# Patient Record
Sex: MALE | Race: WHITE | NOT HISPANIC OR LATINO | Employment: OTHER | URBAN - METROPOLITAN AREA
[De-identification: names, ages, dates, MRNs, and addresses within clinical notes are randomized per-mention and may not be internally consistent; named-entity substitution may affect disease eponyms.]

---

## 2017-01-03 ENCOUNTER — TREATMENT (OUTPATIENT)
Dept: PHYSICAL THERAPY | Facility: CLINIC | Age: 72
End: 2017-01-03

## 2017-01-03 DIAGNOSIS — G57.01 NEUROPATHY OF RIGHT SCIATIC NERVE: Primary | ICD-10-CM

## 2017-01-03 DIAGNOSIS — R29.3 POOR POSTURE: ICD-10-CM

## 2017-01-03 DIAGNOSIS — M51.36 DDD (DEGENERATIVE DISC DISEASE), LUMBAR: ICD-10-CM

## 2017-01-03 DIAGNOSIS — R26.9 GAIT DISTURBANCE: ICD-10-CM

## 2017-01-03 PROCEDURE — 97112 NEUROMUSCULAR REEDUCATION: CPT | Performed by: PHYSICAL THERAPIST

## 2017-01-03 PROCEDURE — 97530 THERAPEUTIC ACTIVITIES: CPT | Performed by: PHYSICAL THERAPIST

## 2017-01-03 PROCEDURE — 97110 THERAPEUTIC EXERCISES: CPT | Performed by: PHYSICAL THERAPIST

## 2017-01-03 NOTE — PROGRESS NOTES
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  Hip: 4/10, Lower back: 2/10 at worst, but generally no back pain.    Subjective Questionnaire: Oswestry: 34  Clinical Progress: improved  Home Program Compliance: Yes, but must work strictly from handouts   Treatment has included: therapeutic exercise, neuromuscular re-education, manual therapy and gait training    Objective:       AT EVALUATION:   Gait analysis: Noted retro trunk way in gait. Limited push off (B) and extensive time on heels with gait. Limited trunk rotation.   Balance: Narrow VASU: eyes open: 30 sec, Eyes closed 25 sec. Posterior trunk lean.   Narrow VASU on Foam: eyes open: 30 sec, Closed: 10 sec.   Tandem: R on L: 3sec max, needs assist to assume position. L on R: needs help to assume position but can hold 12 sec.   SLS: R: unable, needs help to keep from falling, L: 2-3 sec. CGAx1.   TU.58 sec. No AD.   30 sec sit to stand: 12 x, no AD. Did use back of legs on chair 50% of the time.     Current: Gait:                  Balance: narrow VASU: Eyes closed: 30 sec                                 Narrow VASU on Foam, eyes Closed: 30 sec                                 Tandem: R on L: 25 sec. With L on R: 30 sec          SLS: R: unable to hold but can attempt indepently,  L: unable to hold but can attempt (I)    AROM:      Lumbar Spine Range of Motion  Lumbar Spine Flexion: 65  Lumbar Spine Extension: 20  Lumbar Spine Sidebend Left: 38  Lumbar Spine Sidebend Right: 46  Lumbar Spine Rotation Left: 62  Lumbar Spine Rotation Right: 68  Strength:      Strength RLE  R Hip Flexion: 5-/5 (L:5-/5)  R Hip Extension: 4/5 (L:5-/5)  R Hip  ABduction: 5-/5 (L:4+/5)  R Knee Flexion: 5/5  R Knee Extension: 5/5  R Ankle Dorsiflexion: 5/5  R Ankle Plantar Flexion: 4+/5 ((B) Heel rise.)              Special Tests:  TU.36 sec, 9.02sec (following cues to put weight through forefoot).    30 Sec. Sit to stand: 15x no UE use for assist.  No leaning on chair observed.   Assessment:   Functional Limitations: Walking, Lifting, Complaints of Pain, Decreased Strength, Decreased ROM, Postural Dysfunction, Poor segmental mobility   Pt has done very well with treatment overall.  Noted by objective measures, his ROM in the trunk and LE strength have both improved to help facilitate improved function with testing of TUG, Sit to stand and balance.  Unfortunately, he suffered a fall over the holiday reaching and lifting something overhead.  Due to lingering soreness of the R hip and ongoing gait and balance deficits, he would benefit from ongoing PT services for improved safety.     Plan:   PT Interventions: Therapeutic exercise - AROM, Therapeutic exercise - stretching, Therapeutic exercise - strengthening, Manual Therapy, Soft Tissue mobilization, Hot packs/ Moist heat  Progress toward previous goals: Partially Met     SHORT TERM GOALS:   4-5 visits     1. Patient to be compliant and progression of HEP. (MET)  2. Pain level < 5/10 at worst with mentioned activities to improve function around the home. (MET)  3. Increased thoracic lumbar and SIJ mobility to allow for increased lumbar AROM by 10 degrees (rot,)with less pain. (MET)  4. Pt able to obtain and maintain neutral posture on foam for > 1 min for improved axial loading. (MET, but cues for obtaining posture initial set up)     LONG TERM GOALS:  8-10 visits   1. Pt. to score < 15 % on Back Index. (not met)   2. Improve balance for improved safety and tolerance to standing. (SLS to 5 sec.(Modified) Tandem to 30 sec) (partially met, SLS not met)  3. Lumbar AROM to WFL all planes to allow for return to household &  recreational activities w/o increased symptoms.  (MET)  4. (B) LE and trunk strength to 5/5 to allow for pushing, pulling and activities to occur without pain > 2/10. (not met)   (Pushing garbage back toward home on driveway withno difficulty)  5. Pt will report the ability to walk/standin > 30 min before requiring sit down rest. (Progress toward goal)   6. Pt to score 4/4 on M-CTSIB for improved balance. (MET)    Recommendations: Continue as planned  Timeframe: 3 weeks  Prognosis to achieve goals: good    Therapy Exercise 71041 15 minutes, NMR 57512 8 minutes and Ther Act 13829 15 minutes    Timed Code Treatment: 38   Minutes     Total Treatment Time: 50      Minutes      PT Signature: Santo Gutierrez PT  Lic # [id number]      Based upon review of the patient's progress and continued therapy plan, it is my medical opinion that Julio Busby should continue physical therapy treatment at [facility]  [address] [facility], [address] [address].    Signature:  Alejandra Escobedo MD

## 2017-01-10 ENCOUNTER — TREATMENT (OUTPATIENT)
Dept: PHYSICAL THERAPY | Facility: CLINIC | Age: 72
End: 2017-01-10

## 2017-01-10 DIAGNOSIS — M51.36 DDD (DEGENERATIVE DISC DISEASE), LUMBAR: ICD-10-CM

## 2017-01-10 DIAGNOSIS — R26.9 GAIT DISTURBANCE: ICD-10-CM

## 2017-01-10 DIAGNOSIS — R29.3 POOR POSTURE: ICD-10-CM

## 2017-01-10 DIAGNOSIS — G57.01 NEUROPATHY OF RIGHT SCIATIC NERVE: Primary | ICD-10-CM

## 2017-01-10 PROCEDURE — 97110 THERAPEUTIC EXERCISES: CPT | Performed by: PHYSICAL THERAPIST

## 2017-01-10 PROCEDURE — 97112 NEUROMUSCULAR REEDUCATION: CPT | Performed by: PHYSICAL THERAPIST

## 2017-01-10 NOTE — PROGRESS NOTES
Daily Progress Note  Visits: 9      Subjective  No new complaints.  The right hip hurts less from the fall weeks ago.   Pain level: (0-10):   Objective      See manual, procedures and exercise log  Assessment/Plan  Pt continues to struggle consistently performing normalized gait pattern with heel strike and roll over.  He is seemingly having more neurologic deficits that are limiting his ability to ambulate with less coordination issues.  He may be a candidate for a neurologic consult at conclusion of PT.      Progress per Plan of Care       Manual Therapy:    -     mins  84418;  Therapeutic Exercise:    25     mins  79188;     Neuromuscular Angel:    12    mins  08781;    Therapeutic Activity:     -     mins  73550;     Gait Training:      -     mins  81549;     Ultrasound:     -     mins  65908;    Electrical Stimulation:    -     mins  31708 ( );  Dry Needling     -     mins self-pay    Timed Code Treatment: 37 Minutes   Total Treatment Time: 60 Minutes      Santo Gutierrez PT  Physical Therapist, DPT  #[id number]

## 2017-01-12 ENCOUNTER — TREATMENT (OUTPATIENT)
Dept: PHYSICAL THERAPY | Facility: CLINIC | Age: 72
End: 2017-01-12

## 2017-01-12 DIAGNOSIS — M51.36 DDD (DEGENERATIVE DISC DISEASE), LUMBAR: ICD-10-CM

## 2017-01-12 DIAGNOSIS — G57.01 NEUROPATHY OF RIGHT SCIATIC NERVE: Primary | ICD-10-CM

## 2017-01-12 DIAGNOSIS — R26.9 GAIT DISTURBANCE: ICD-10-CM

## 2017-01-12 DIAGNOSIS — R29.3 POOR POSTURE: ICD-10-CM

## 2017-01-12 PROCEDURE — 97112 NEUROMUSCULAR REEDUCATION: CPT | Performed by: PHYSICAL THERAPIST

## 2017-01-12 PROCEDURE — 97110 THERAPEUTIC EXERCISES: CPT | Performed by: PHYSICAL THERAPIST

## 2017-01-12 NOTE — PROGRESS NOTES
Daily Progress Note  Visits: 10    Subjective  Doing okay, but I still feel some discomfort in the L low back today.  I am doing better pushing my garbage container up driveway.    Pain level: (0-10):   Objective : Observation: pt was able to perform gait training activity with much better form and posture for longer periods of time.       See manual, procedures and exercise log  Assessment/Plan  Discussed possibility of consult with neurologist at conclusion of PT intervention.  He demonstrates enough coordination deficits and balance impairment and gait deviations to warrant at least the discussion of underlying medical issue.  With that said, he has still been able to demonstrate some functional improvements but having some difficulty with carryover.    Progress per Plan of Care       Manual Therapy:    -     mins  91477;  Therapeutic Exercise:    35     mins  43692;     Neuromuscular Angel:    15    mins  68410;    Therapeutic Activity:     -     mins  18520;     Gait Training:      -     mins  38807;     Ultrasound:     -     mins  57423;    Electrical Stimulation:    -     mins  17890 ( );  Dry Needling     -     mins self-pay    Timed Code Treatment: 50 Minutes   Total Treatment Time: 55 Minutes      Santo Gutierrez PT  Physical Therapist, DPT  #[id number]

## 2017-01-17 ENCOUNTER — TREATMENT (OUTPATIENT)
Dept: PHYSICAL THERAPY | Facility: CLINIC | Age: 72
End: 2017-01-17

## 2017-01-17 DIAGNOSIS — M51.36 DDD (DEGENERATIVE DISC DISEASE), LUMBAR: ICD-10-CM

## 2017-01-17 DIAGNOSIS — R26.9 GAIT DISTURBANCE: ICD-10-CM

## 2017-01-17 DIAGNOSIS — G57.01 NEUROPATHY OF RIGHT SCIATIC NERVE: Primary | ICD-10-CM

## 2017-01-17 DIAGNOSIS — R29.3 POOR POSTURE: ICD-10-CM

## 2017-01-17 PROCEDURE — 97116 GAIT TRAINING THERAPY: CPT | Performed by: PHYSICAL THERAPIST

## 2017-01-17 PROCEDURE — 97112 NEUROMUSCULAR REEDUCATION: CPT | Performed by: PHYSICAL THERAPIST

## 2017-01-17 PROCEDURE — 97110 THERAPEUTIC EXERCISES: CPT | Performed by: PHYSICAL THERAPIST

## 2017-01-17 NOTE — PROGRESS NOTES
Daily Progress Note  Visits: 11      Subjective  The Right hip is still just a little sore but its okay.  The Low back just feels tight until I loosen up with stretches  Pain level: (0-10):   Objective : Resisted ambulation with improved posture and push-off.     See manual, procedures and exercise log  Assessment/Plan  Pt continues to make good progress with balance and gait training.  Endurance and posture improving but coordination can still be challenging.      Progress per Plan of Care       Manual Therapy:    -     mins  06021;  Therapeutic Exercise:    35     mins  48661;     Neuromuscular Angel:    10    mins  56670;    Therapeutic Activity:     -     mins  49958;     Gait Training:      10     mins  57034;   (verbal cues while on TM)   Ultrasound:     -     mins  32205;    Electrical Stimulation:    -     mins  54244 ( );  Dry Needling     -     mins self-pay    Timed Code Treatment: 55 Minutes   Total Treatment Time: 65 Minutes      Santo Gutierrez PT  Physical Therapist, DPT  #[id number]

## 2017-01-19 ENCOUNTER — TREATMENT (OUTPATIENT)
Dept: PHYSICAL THERAPY | Facility: CLINIC | Age: 72
End: 2017-01-19

## 2017-01-19 DIAGNOSIS — M51.36 DDD (DEGENERATIVE DISC DISEASE), LUMBAR: ICD-10-CM

## 2017-01-19 DIAGNOSIS — R29.3 POOR POSTURE: ICD-10-CM

## 2017-01-19 DIAGNOSIS — G57.01 NEUROPATHY OF RIGHT SCIATIC NERVE: Primary | ICD-10-CM

## 2017-01-19 DIAGNOSIS — R26.9 GAIT DISTURBANCE: ICD-10-CM

## 2017-01-19 PROCEDURE — 97110 THERAPEUTIC EXERCISES: CPT | Performed by: PHYSICAL THERAPIST

## 2017-01-19 PROCEDURE — 97112 NEUROMUSCULAR REEDUCATION: CPT | Performed by: PHYSICAL THERAPIST

## 2017-01-19 NOTE — PROGRESS NOTES
Daily Progress Note  Visits: 12      Subjective  No new complaints from patient.  Hip continues to improve but still has some lingering soreness.    Pain level: (0-10):   Objective      See manual, procedures and exercise log  Assessment/Plan  Pt continues to demonstrate improving stability with practice but still has difficulty with routine stretching activity that is to be performed at home independently. Will re-assess next week for D/C     Progress per Plan of Care       Manual Therapy:    5     mins  55643;  Therapeutic Exercise:    25     mins  02597;     Neuromuscular Angel:    15    mins  86509;    Therapeutic Activity:     -     mins  17798;     Gait Training:      -     mins  91822;     Ultrasound:     -     mins  35558;    Electrical Stimulation:    -     mins  85211 ( );  Dry Needling     -     mins self-pay    Timed Code Treatment: 45 Minutes   Total Treatment Time: 65 Minutes      Santo Gutierrez PT  Physical Therapist, DPT  #[id number]

## 2017-01-24 ENCOUNTER — TREATMENT (OUTPATIENT)
Dept: PHYSICAL THERAPY | Facility: CLINIC | Age: 72
End: 2017-01-24

## 2017-01-24 DIAGNOSIS — R26.9 GAIT DISTURBANCE: ICD-10-CM

## 2017-01-24 DIAGNOSIS — G57.01 NEUROPATHY OF RIGHT SCIATIC NERVE: Primary | ICD-10-CM

## 2017-01-24 DIAGNOSIS — M51.36 DDD (DEGENERATIVE DISC DISEASE), LUMBAR: ICD-10-CM

## 2017-01-24 DIAGNOSIS — R29.3 POOR POSTURE: ICD-10-CM

## 2017-01-24 PROCEDURE — 97112 NEUROMUSCULAR REEDUCATION: CPT | Performed by: PHYSICAL THERAPIST

## 2017-01-24 PROCEDURE — 97110 THERAPEUTIC EXERCISES: CPT | Performed by: PHYSICAL THERAPIST

## 2017-01-24 NOTE — PROGRESS NOTES
Physical Therapy Daily Progress Note  Visits:13    Julio Busby reports: Feeling pretty good today.    Subjective : Pain:  Objective : Gait: 3% grade on TM good heel strike and stride length but after verbal cues to initiate walking.    See Exercise, Manual, and Modality Logs for complete treatment.   Assessment & Plan     Assessment  Assessment details: Pt was able to perform gait training with best mechanics to date.  His balance reactions have much improved and we were able to review his HEP for pending D/C next visit.        Progress per Plan of Care, progress note next visit.         Manual Therapy:    -     mins  86489;  Therapeutic Exercise:    25     mins  87763;     Neuromuscular Angel:    15    mins  93677;    Therapeutic Activity:     -     mins  25074;     Gait Trainin     mins  62588;     Ultrasound:     -     mins  49367;    Electrical Stimulation:    -     mins  18195 ( );  Dry Needling     -     mins self-pay    Timed Treatment:   45  mins   Total Treatment:     65   mins    LALO Petersen License #[id number]    Physical Therapist

## 2017-01-26 ENCOUNTER — TREATMENT (OUTPATIENT)
Dept: PHYSICAL THERAPY | Facility: CLINIC | Age: 72
End: 2017-01-26

## 2017-01-26 DIAGNOSIS — M51.36 DDD (DEGENERATIVE DISC DISEASE), LUMBAR: ICD-10-CM

## 2017-01-26 DIAGNOSIS — R29.3 POOR POSTURE: ICD-10-CM

## 2017-01-26 DIAGNOSIS — R26.9 GAIT DISTURBANCE: ICD-10-CM

## 2017-01-26 DIAGNOSIS — G57.01 NEUROPATHY OF RIGHT SCIATIC NERVE: Primary | ICD-10-CM

## 2017-01-26 PROCEDURE — 97110 THERAPEUTIC EXERCISES: CPT | Performed by: PHYSICAL THERAPIST

## 2017-01-26 PROCEDURE — 97112 NEUROMUSCULAR REEDUCATION: CPT | Performed by: PHYSICAL THERAPIST

## 2017-01-26 NOTE — PROGRESS NOTES
Discharge Note  Visits: 14, Treatment dates 16 to 17    Subjective   The back does not hurt much at all and feels much better when I'm sleeping.    Pain level (0-10) :  0/10 right now, when sleeping its about 1/10.    Objective      Current:balance:   Balance: narrow VASU: Eyes closed: 30 sec  Narrow VASU on Foam, eyes Closed: 30 sec  Tandem: R on L: 25 sec. With L on R: 30 sec   SLS: R: unable to hold but can attempt indepently, L: unable to hold but can attempt (I)         TU.9 sec., 8.43 sec.   30 Sec. Sit to stand: 15x no UE use for assist. No leaning on chair observed. (not re-tested)   Outcomes Measure: 22%       SHORT TERM GOALS:   4-5 visits   1. Patient to be compliant and progression of HEP. (MET)  2. Pain level < 5/10 at worst with mentioned activities to improve function around the home. (MET)  3. Increased thoracic lumbar and SIJ mobility to allow for increased lumbar AROM by 10 degrees (rot,)with less pain. (MET)  4. Pt able to obtain and maintain neutral posture on foam for > 1 min for improved axial loading. (MET, no cues required)      LONG TERM GOALS:  8-10 visits   1. Pt. to score < 15 % on Back Index. (not met)   2. Improve balance for improved safety and tolerance to standing. (SLS to 5 sec.(Modified) Tandem to 30 sec) (partially met, Tandem met, SLS unable)                             3. Lumbar AROM to WFL all planes to allow for return to household & recreational activities w/o increased symptoms.  (MET)    4. (B) LE and trunk strength to 5/5 to allow for pushing, pulling and activities to occur without pain >   2/10. (not met for strength )     (Pushing garbage back toward home on driveway withno difficulty)    5. Pt will report the ability to walk/standin > 30 min before requiring sit down rest. (MET)     6. Pt to score 4/4 on M-CTSIB for improved balance. (MET)    Goals: All Met, except Back index (22%) and strength.      Assessment/Plan /Discharge status.  Discharge to [facility].  Pt  has done very well with skilled PT intervention and has met plateu with progress.      Other  Plan Additional:  Discharge to [facility]      Manual Therapy:    -     mins  13163;  Therapeutic Exercise:    15     mins  85757;     Neuromuscular Angel:    10    mins  83185;    Therapeutic Activity:     6     mins  52716;     Gait Training:      -     mins  51372;     Ultrasound:     -     mins  46032;    Electrical Stimulation:    -     mins  05600 ( );  Dry Needling     -     mins self-pay    Timed treatment minutes: 31  Total treatment minutes:58      Santo Gutierrez PT  Physical Therapist  #[id number]

## 2017-02-02 ENCOUNTER — OFFICE VISIT (OUTPATIENT)
Dept: FAMILY MEDICINE CLINIC | Facility: CLINIC | Age: 72
End: 2017-02-02

## 2017-02-02 VITALS
DIASTOLIC BLOOD PRESSURE: 74 MMHG | HEIGHT: 68 IN | BODY MASS INDEX: 24.71 KG/M2 | SYSTOLIC BLOOD PRESSURE: 116 MMHG | WEIGHT: 163 LBS | TEMPERATURE: 97.7 F | HEART RATE: 58 BPM | OXYGEN SATURATION: 98 %

## 2017-02-02 DIAGNOSIS — I10 ESSENTIAL HYPERTENSION: ICD-10-CM

## 2017-02-02 DIAGNOSIS — R53.83 TIRED: ICD-10-CM

## 2017-02-02 DIAGNOSIS — E78.5 HYPERLIPIDEMIA, UNSPECIFIED HYPERLIPIDEMIA TYPE: ICD-10-CM

## 2017-02-02 DIAGNOSIS — M51.36 DDD (DEGENERATIVE DISC DISEASE), LUMBAR: Primary | ICD-10-CM

## 2017-02-02 PROCEDURE — 99214 OFFICE O/P EST MOD 30 MIN: CPT | Performed by: FAMILY MEDICINE

## 2017-02-02 RX ORDER — HYDROCODONE BITARTRATE AND ACETAMINOPHEN 10; 325 MG/1; MG/1
1 TABLET ORAL EVERY 6 HOURS PRN
Qty: 60 TABLET | Refills: 0 | Status: SHIPPED | OUTPATIENT
Start: 2017-02-02 | End: 2017-05-03 | Stop reason: SDUPTHER

## 2017-02-02 NOTE — PROGRESS NOTES
Subjective   Julio Busby is a 71 y.o. male.     History of Present Illness   Julio Busby 71 y.o. male who presents today for recheck of lumbar DDD and left sciatica. Continues with radiation into the left buttucks.  Pain is aggravated by standing. Did see some improvement with physical therapy.  he has a history of   Patient Active Problem List   Diagnosis   • CVA (cerebrovascular accident)   • Diverticulosis of colon without hemorrhage   • Hyperlipidemia   • Essential hypertension   • Anemia due to blood loss, chronic   • DDD (degenerative disc disease), lumbar   • Neuropathy of right sciatic nerve   . Also complains of worsening fatigue over the last 2 months.    The following portions of the patient's history were reviewed and updated as appropriate: allergies, current medications, past family history, past medical history, past social history, past surgical history and problem list.    Review of Systems   Constitutional: Positive for fatigue.   Respiratory: Negative for shortness of breath.    Cardiovascular: Negative for chest pain and leg swelling.   Gastrointestinal: Negative for nausea and vomiting.   Musculoskeletal: Positive for back pain.   Skin: Negative.        Objective   Physical Exam   Constitutional: He appears well-developed and well-nourished.   HENT:   Head: Normocephalic.   Eyes: EOM are normal. Pupils are equal, round, and reactive to light.   Neck: Normal range of motion.   Cardiovascular: Normal rate, regular rhythm and normal heart sounds.    Pulmonary/Chest: Effort normal and breath sounds normal.   Skin: Skin is warm and dry.   Psychiatric: He has a normal mood and affect. His behavior is normal. Judgment and thought content normal.   Vitals reviewed.      Assessment/Plan   Julio was seen today for back pain.    Diagnoses and all orders for this visit:    DDD (degenerative disc disease), lumbar  -     HYDROcodone-acetaminophen (NORCO)  MG per tablet; Take 1 tablet by mouth  Every 6 (Six) Hours As Needed for moderate pain (4-6).    Tired  -     Lipid Panel  -     Comprehensive Metabolic Panel  -     TSH  -     CBC & AUTO Differential    Essential hypertension  -     Comprehensive Metabolic Panel    Hyperlipidemia, unspecified hyperlipidemia type  -     Lipid Panel    Review labs and start cymbalta if normal

## 2017-02-03 LAB
ALBUMIN SERPL-MCNC: 4.1 G/DL (ref 3.5–4.8)
ALBUMIN/GLOB SERPL: 1.8 {RATIO} (ref 1.1–2.5)
ALP SERPL-CCNC: 89 IU/L (ref 39–117)
ALT SERPL-CCNC: 8 IU/L (ref 0–44)
AST SERPL-CCNC: 15 IU/L (ref 0–40)
BASOPHILS # BLD AUTO: 0 X10E3/UL (ref 0–0.2)
BASOPHILS NFR BLD AUTO: 0 %
BILIRUB SERPL-MCNC: 1 MG/DL (ref 0–1.2)
BUN SERPL-MCNC: 16 MG/DL (ref 8–27)
BUN/CREAT SERPL: 19 (ref 10–22)
CALCIUM SERPL-MCNC: 9.2 MG/DL (ref 8.6–10.2)
CHLORIDE SERPL-SCNC: 99 MMOL/L (ref 96–106)
CHOLEST SERPL-MCNC: 174 MG/DL (ref 100–199)
CO2 SERPL-SCNC: 26 MMOL/L (ref 18–29)
CREAT SERPL-MCNC: 0.85 MG/DL (ref 0.76–1.27)
EOSINOPHIL # BLD AUTO: 0.1 X10E3/UL (ref 0–0.4)
EOSINOPHIL NFR BLD AUTO: 2 %
ERYTHROCYTE [DISTWIDTH] IN BLOOD BY AUTOMATED COUNT: 13.2 % (ref 12.3–15.4)
GLOBULIN SER CALC-MCNC: 2.3 G/DL (ref 1.5–4.5)
GLUCOSE SERPL-MCNC: 90 MG/DL (ref 65–99)
HCT VFR BLD AUTO: 41.1 % (ref 37.5–51)
HDLC SERPL-MCNC: 66 MG/DL
HGB BLD-MCNC: 13.6 G/DL (ref 12.6–17.7)
IMM GRANULOCYTES # BLD: 0 X10E3/UL (ref 0–0.1)
IMM GRANULOCYTES NFR BLD: 0 %
LDLC SERPL CALC-MCNC: 78 MG/DL (ref 0–99)
LYMPHOCYTES # BLD AUTO: 2 X10E3/UL (ref 0.7–3.1)
LYMPHOCYTES NFR BLD AUTO: 32 %
MCH RBC QN AUTO: 29.2 PG (ref 26.6–33)
MCHC RBC AUTO-ENTMCNC: 33.1 G/DL (ref 31.5–35.7)
MCV RBC AUTO: 88 FL (ref 79–97)
MONOCYTES # BLD AUTO: 0.7 X10E3/UL (ref 0.1–0.9)
MONOCYTES NFR BLD AUTO: 11 %
NEUTROPHILS # BLD AUTO: 3.6 X10E3/UL (ref 1.4–7)
NEUTROPHILS NFR BLD AUTO: 55 %
PLATELET # BLD AUTO: 232 X10E3/UL (ref 150–379)
POTASSIUM SERPL-SCNC: 4.5 MMOL/L (ref 3.5–5.2)
PROT SERPL-MCNC: 6.4 G/DL (ref 6–8.5)
RBC # BLD AUTO: 4.66 X10E6/UL (ref 4.14–5.8)
SODIUM SERPL-SCNC: 139 MMOL/L (ref 134–144)
TRIGL SERPL-MCNC: 152 MG/DL (ref 0–149)
TSH SERPL DL<=0.005 MIU/L-ACNC: 0.77 UIU/ML (ref 0.45–4.5)
VLDLC SERPL CALC-MCNC: 30 MG/DL (ref 5–40)
WBC # BLD AUTO: 6.5 X10E3/UL (ref 3.4–10.8)

## 2017-02-03 RX ORDER — DULOXETIN HYDROCHLORIDE 60 MG/1
60 CAPSULE, DELAYED RELEASE ORAL DAILY
Qty: 30 CAPSULE | Refills: 5 | Status: SHIPPED | OUTPATIENT
Start: 2017-02-03 | End: 2017-05-03 | Stop reason: SDUPTHER

## 2017-02-12 DIAGNOSIS — I10 ESSENTIAL HYPERTENSION: ICD-10-CM

## 2017-02-12 RX ORDER — LISINOPRIL 10 MG/1
TABLET ORAL
Qty: 30 TABLET | Refills: 0 | Status: SHIPPED | OUTPATIENT
Start: 2017-02-12 | End: 2017-03-19 | Stop reason: SDUPTHER

## 2017-02-22 ENCOUNTER — DOCUMENTATION (OUTPATIENT)
Dept: PHYSICAL THERAPY | Facility: CLINIC | Age: 72
End: 2017-02-22

## 2017-03-19 DIAGNOSIS — I10 ESSENTIAL HYPERTENSION: ICD-10-CM

## 2017-03-19 RX ORDER — LISINOPRIL 10 MG/1
TABLET ORAL
Qty: 30 TABLET | Refills: 0 | Status: SHIPPED | OUTPATIENT
Start: 2017-03-19 | End: 2017-04-17 | Stop reason: SDUPTHER

## 2017-04-17 DIAGNOSIS — I10 ESSENTIAL HYPERTENSION: ICD-10-CM

## 2017-04-17 RX ORDER — LISINOPRIL 10 MG/1
TABLET ORAL
Qty: 30 TABLET | Refills: 0 | Status: SHIPPED | OUTPATIENT
Start: 2017-04-17 | End: 2017-05-03 | Stop reason: SDUPTHER

## 2017-05-03 ENCOUNTER — OFFICE VISIT (OUTPATIENT)
Dept: FAMILY MEDICINE CLINIC | Facility: CLINIC | Age: 72
End: 2017-05-03

## 2017-05-03 VITALS
WEIGHT: 155 LBS | SYSTOLIC BLOOD PRESSURE: 135 MMHG | HEIGHT: 68 IN | BODY MASS INDEX: 23.49 KG/M2 | DIASTOLIC BLOOD PRESSURE: 80 MMHG | TEMPERATURE: 97.6 F | HEART RATE: 50 BPM | RESPIRATION RATE: 14 BRPM

## 2017-05-03 DIAGNOSIS — I10 ESSENTIAL HYPERTENSION: ICD-10-CM

## 2017-05-03 DIAGNOSIS — M51.36 DDD (DEGENERATIVE DISC DISEASE), LUMBAR: Primary | ICD-10-CM

## 2017-05-03 DIAGNOSIS — I63.89 CEREBROVASCULAR ACCIDENT (CVA) DUE TO OTHER MECHANISM (HCC): ICD-10-CM

## 2017-05-03 DIAGNOSIS — E78.2 MIXED HYPERLIPIDEMIA: ICD-10-CM

## 2017-05-03 DIAGNOSIS — R29.898 WEAKNESS OF BOTH LOWER EXTREMITIES: ICD-10-CM

## 2017-05-03 PROCEDURE — 99214 OFFICE O/P EST MOD 30 MIN: CPT | Performed by: FAMILY MEDICINE

## 2017-05-03 RX ORDER — ATORVASTATIN CALCIUM 20 MG/1
20 TABLET, FILM COATED ORAL DAILY
Qty: 90 TABLET | Refills: 1 | Status: SHIPPED | OUTPATIENT
Start: 2017-05-03

## 2017-05-03 RX ORDER — HYDROCODONE BITARTRATE AND ACETAMINOPHEN 10; 325 MG/1; MG/1
1 TABLET ORAL 2 TIMES DAILY PRN
Qty: 60 TABLET | Refills: 0 | Status: SHIPPED | OUTPATIENT
Start: 2017-05-03 | End: 2017-06-26

## 2017-05-03 RX ORDER — LISINOPRIL 10 MG/1
10 TABLET ORAL DAILY
Qty: 30 TABLET | Refills: 5 | Status: SHIPPED | OUTPATIENT
Start: 2017-05-03

## 2017-05-03 RX ORDER — CLOPIDOGREL BISULFATE 75 MG/1
75 TABLET ORAL DAILY
Qty: 90 TABLET | Refills: 1 | Status: SHIPPED | OUTPATIENT
Start: 2017-05-03

## 2017-05-03 RX ORDER — DULOXETIN HYDROCHLORIDE 60 MG/1
60 CAPSULE, DELAYED RELEASE ORAL DAILY
Qty: 30 CAPSULE | Refills: 5 | Status: SHIPPED | OUTPATIENT
Start: 2017-05-03 | End: 2017-07-27 | Stop reason: ALTCHOICE

## 2017-05-15 ENCOUNTER — OFFICE VISIT (OUTPATIENT)
Dept: FAMILY MEDICINE CLINIC | Facility: CLINIC | Age: 72
End: 2017-05-15

## 2017-05-15 VITALS
HEART RATE: 54 BPM | TEMPERATURE: 97.7 F | RESPIRATION RATE: 14 BRPM | BODY MASS INDEX: 22.81 KG/M2 | HEIGHT: 69 IN | SYSTOLIC BLOOD PRESSURE: 137 MMHG | DIASTOLIC BLOOD PRESSURE: 79 MMHG | WEIGHT: 154 LBS

## 2017-05-15 DIAGNOSIS — R04.0 EPISTAXIS: ICD-10-CM

## 2017-05-15 DIAGNOSIS — Z00.00 ANNUAL PHYSICAL EXAM: Primary | ICD-10-CM

## 2017-05-15 PROCEDURE — G0438 PPPS, INITIAL VISIT: HCPCS | Performed by: FAMILY MEDICINE

## 2017-05-15 PROCEDURE — 99212 OFFICE O/P EST SF 10 MIN: CPT | Performed by: FAMILY MEDICINE

## 2017-06-19 ENCOUNTER — OFFICE VISIT (OUTPATIENT)
Dept: FAMILY MEDICINE CLINIC | Facility: CLINIC | Age: 72
End: 2017-06-19

## 2017-06-19 VITALS
WEIGHT: 152 LBS | HEIGHT: 69 IN | TEMPERATURE: 98.2 F | HEART RATE: 49 BPM | BODY MASS INDEX: 22.51 KG/M2 | SYSTOLIC BLOOD PRESSURE: 114 MMHG | DIASTOLIC BLOOD PRESSURE: 68 MMHG

## 2017-06-19 DIAGNOSIS — R41.3 COMPLAINTS OF MEMORY DISTURBANCE: Primary | ICD-10-CM

## 2017-06-19 PROCEDURE — 2014F MENTAL STATUS ASSESS: CPT | Performed by: FAMILY MEDICINE

## 2017-06-19 PROCEDURE — 99213 OFFICE O/P EST LOW 20 MIN: CPT | Performed by: FAMILY MEDICINE

## 2017-06-19 RX ORDER — DONEPEZIL HYDROCHLORIDE 5 MG/1
5 TABLET, FILM COATED ORAL NIGHTLY
Qty: 30 TABLET | Refills: 5 | Status: SHIPPED | OUTPATIENT
Start: 2017-06-19 | End: 2017-08-28 | Stop reason: SDUPTHER

## 2017-06-19 NOTE — PROGRESS NOTES
"Subjective   Julio Busby is a 71 y.o. male.     CC: Memory Issues    History of Present Illness     Pt comes in with his son today concerned about some memory decline over the past several years.   Mainly short memory issues and no weird behaviors. FH: no dementia.      The following portions of the patient's history were reviewed and updated as appropriate: allergies, current medications, past family history, past medical history, past social history, past surgical history and problem list.    Review of Systems   Constitutional: Negative for activity change, chills, fatigue and fever.   Respiratory: Negative for cough and shortness of breath.    Cardiovascular: Negative for chest pain and palpitations.   Gastrointestinal: Negative for abdominal pain.   Endocrine: Negative for cold intolerance.   Neurological:        Memory issues   Psychiatric/Behavioral: Negative for behavioral problems and dysphoric mood. The patient is not nervous/anxious.      /68  Pulse (!) 49  Temp 98.2 °F (36.8 °C) (Oral)   Ht 68.5\" (174 cm)  Wt 152 lb (68.9 kg)  BMI 22.78 kg/m2    Objective   Physical Exam   Constitutional: He appears well-developed and well-nourished.   Neck: Neck supple. No thyromegaly present.   Cardiovascular: Normal rate and regular rhythm.    No murmur heard.  Pulmonary/Chest: Effort normal and breath sounds normal.   Abdominal: Bowel sounds are normal.   Psychiatric: He has a normal mood and affect. His behavior is normal.   Nursing note and vitals reviewed.  MMSE: 25/30    Assessment/Plan   Julio was seen today for memory loss.    Diagnoses and all orders for this visit:    Complaints of memory disturbance  -     donepezil (ARICEPT) 5 MG tablet; Take 1 tablet by mouth Every Night.  -     Ambulatory Referral to Neurology               "

## 2017-06-26 ENCOUNTER — OFFICE VISIT (OUTPATIENT)
Dept: NEUROSURGERY | Facility: CLINIC | Age: 72
End: 2017-06-26

## 2017-06-26 VITALS
WEIGHT: 152 LBS | SYSTOLIC BLOOD PRESSURE: 136 MMHG | HEART RATE: 57 BPM | BODY MASS INDEX: 22.51 KG/M2 | HEIGHT: 69 IN | DIASTOLIC BLOOD PRESSURE: 77 MMHG

## 2017-06-26 DIAGNOSIS — Z86.73 HX OF CEREBRAL INFARCTION: ICD-10-CM

## 2017-06-26 DIAGNOSIS — M48.062 SPINAL STENOSIS, LUMBAR REGION, WITH NEUROGENIC CLAUDICATION: ICD-10-CM

## 2017-06-26 DIAGNOSIS — M43.16 SPONDYLOLISTHESIS AT L4-L5 LEVEL: Primary | ICD-10-CM

## 2017-06-26 PROCEDURE — 99204 OFFICE O/P NEW MOD 45 MIN: CPT | Performed by: NURSE PRACTITIONER

## 2017-06-26 NOTE — PROGRESS NOTES
Subjective   Patient ID: Julio Busby is a 71 y.o. male is being seen for consultation today at the request of Sidney Brandt MD for low back pain that radiates into the left and right buttocks. He states that is is painful when he walks. He is not currently taking any pain medication.    HPI Comments: Mr. Busby is a very pleasant 71-year-old male with a history of 2 prior strokes in the 1990s.  He is currently on Plavix.  He also has a history of hypertension.  He has a 9 month history of worsening low back pain with radiation into both buttocks and down the lateral aspect of both mid-thighs.  The right leg pain is equal to the left.  He has been having increasing difficulty with walking and has associated tingling in both legs.  He had similar symptoms back in 2016.  He underwent an MRI of the lumbar spine and was referred for outpatient epidural injections.  He did not notice any significant improvement after the injections.  Mr. Busby has not undergone any subsequent imaging of his lumbar spine.  He denies any bowel or bladder incontinence but is reporting weakness with prolonged standing and walking.  He denies any falls.  He states that his leg symptoms are much more severe than the back pain.    Back Pain   This is a recurrent problem. The current episode started more than 1 month ago. The problem occurs constantly. The problem has been gradually worsening since onset. The pain is present in the lumbar spine and gluteal. The quality of the pain is described as aching. Radiates to: bilateral buttocks and into the upper thigh bilaterallly. The pain is at a severity of 6/10. The pain is moderate. The pain is the same all the time. The symptoms are aggravated by standing (walking). Associated symptoms include leg pain, tingling and weakness. Pertinent negatives include no bladder incontinence or bowel incontinence. Risk factors: Hx of CVA - on Plavix. Treatments tried: SEBASTIÁN, PT, oral narcotics. The treatment  provided no relief.       The following portions of the patient's history were reviewed and updated as appropriate: allergies, current medications, past family history, past medical history, past social history, past surgical history and problem list.    Review of Systems   Constitutional: Positive for activity change.   Gastrointestinal: Negative for bowel incontinence.   Genitourinary: Negative for bladder incontinence.   Musculoskeletal: Positive for arthralgias, back pain and gait problem.   Neurological: Positive for tingling and weakness.        Recently saw his primary care physician for continued episodes of memory loss/confusion.  He was started on Aricept.   All other systems reviewed and are negative.      Objective   Physical Exam   Constitutional: He is oriented to person, place, and time. He appears well-developed and well-nourished. He is cooperative. No distress.   HENT:   Head: Normocephalic and atraumatic.   Eyes: Conjunctivae and EOM are normal. Pupils are equal, round, and reactive to light.   Neck: Normal range of motion. Neck supple.   Cardiovascular: Normal rate.    Pulmonary/Chest: Effort normal. No respiratory distress.   Abdominal: Soft. He exhibits no distension. There is no tenderness.   Musculoskeletal: Normal range of motion. He exhibits tenderness (with palpation in the upper buttock region particularly on the right side.). He exhibits no edema.   Neurological: He is alert and oriented to person, place, and time. He has normal reflexes. He displays atrophy (R calf 12 3/4 inches; left calf 13 1/2 inches. ). He displays normal reflexes. A sensory deficit is present. He exhibits normal muscle tone. He has a normal Finger-Nose-Finger Test and a normal Romberg Test. Coordination (unable to heel or toe walk bilaterally L>R) and gait (Mild steppage gait on the left. ) abnormal. GCS eye subscore is 4. GCS verbal subscore is 5. GCS motor subscore is 6.   Reflex Scores:       Tricep reflexes are  2+ on the right side and 2+ on the left side.       Bicep reflexes are 2+ on the right side and 2+ on the left side.       Brachioradialis reflexes are 2+ on the right side and 2+ on the left side.       Patellar reflexes are 2+ on the right side and 2+ on the left side.       Achilles reflexes are 2+ on the right side and 2+ on the left side.  Motor exam normal except for L tibialis anterior which is 4-/5. Sensation decreased on the left. + SLR on the left at 30 degrees. Negative Oprter's; negative clonus   Skin: Skin is warm and dry. No rash noted. He is not diaphoretic.   Psychiatric: He has a normal mood and affect. His speech is normal. Thought content normal.   Vitals reviewed.    Neurologic Exam     Mental Status   Oriented to person, place, and time.   Speech: speech is normal   Level of consciousness: alert    Cranial Nerves     CN III, IV, VI   Pupils are equal, round, and reactive to light.  Extraocular motions are normal.     Motor Exam   Muscle bulk: decreased  Overall muscle tone: normal    Strength   Strength 5/5 except as noted.   Left strength: Left tibialis anterior 4-/5.   Left anterior tibial: 4/5    Sensory Exam   Right arm light touch: normal  Left arm light touch: normal    Gait, Coordination, and Reflexes     Gait  Gait: (Mild steppage gait on the left. )    Coordination   Romberg: negative  Finger to nose coordination: normal    Reflexes   Right brachioradialis: 2+  Left brachioradialis: 2+  Right biceps: 2+  Left biceps: 2+  Right triceps: 2+  Left triceps: 2+  Right patellar: 2+  Left patellar: 2+  Right achilles: 2+  Left achilles: 2+  Right Porter: absent  Left Porter: absent  Right ankle clonus: absent  Left ankle clonus: absent      Assessment/Plan   Independent Review of Radiographic Studies:      I have reviewed the MRI images of the lumbar spine performed 6/20/16 today in the office. The MRI showed multi-level degenerative changes with facet overgrowth bilaterally. This was most  severe at L4/5 with an associated 7-8 mm degenerative anterolisthesis of L4 on L5 as well as a diffuse posterior disc bulge contributing to severe canal narrowing.     Medical Decision Making:      Mr. Busby is being seen today for a neurosurgical consultation at the request of Dr. Sidney Brandt. Notes from today's visit will be forwarded upon completion.  He has a history of worsening back and bilateral buttock and mid thigh pain left greater than right.  He had the same symptoms a little over a year ago and underwent epidural injections with no real relief.    Given the findings on exam, I do feel that further evaluation is necessary.   did not want to try lumbar SEBASTIÁN's again. He is at the end of his rope with these symptoms.     I have discussed the myelogram procedure at length with the patient. The risks of the procedure were explained. There is a risk of infection, bleeding, increased pain and positional headache possibly requiring a blood patch. The best way to avoid the positional headache is by taking it easy and participating in no strenuous activity for a couple of days following the myelogram. Drinking plenty of fluids including caffeinated beverages was encouraged. Should the patient develop a positional headache, I recommended calling the office for further instructions. The patient verbalized understanding of these risks and asked to proceed.     Mr. Busby understands that he will need to come off plavix for the myelogram.  He will also need a cardiology evaluation for surgical clearance.  Given the history of prior stroke and evidence of carotid stenosis on a prior carotid duplex study performed in 2011, I will go ahead and refer him for a vascular evaluation as well.    Mr. Busby will call us if he has any problems after the myelogram.  He will follow-up thereafter for a surgical discussion with Dr. Jennings.       Julio was seen today for back pain.    Diagnoses and all orders for  this visit:    Spondylolisthesis at L4-L5 level  -     Ambulatory Referral to Cardiology  -     Ambulatory Referral to Vascular Surgery  -     IR Myelogram Lumbar Spine; Future  -     CT lumbar spine wo contrast; Future    Spinal stenosis, lumbar region, with neurogenic claudication  -     Ambulatory Referral to Cardiology  -     Ambulatory Referral to Vascular Surgery  -     IR Myelogram Lumbar Spine; Future  -     CT lumbar spine wo contrast; Future    Hx of cerebral infarction      Return for after radiographic imaging.

## 2017-07-27 ENCOUNTER — OFFICE VISIT (OUTPATIENT)
Dept: CARDIOLOGY | Facility: CLINIC | Age: 72
End: 2017-07-27

## 2017-07-27 VITALS
SYSTOLIC BLOOD PRESSURE: 150 MMHG | DIASTOLIC BLOOD PRESSURE: 82 MMHG | HEIGHT: 69 IN | HEART RATE: 51 BPM | BODY MASS INDEX: 22.96 KG/M2 | WEIGHT: 155 LBS

## 2017-07-27 DIAGNOSIS — R94.31 ABNORMAL ECG: ICD-10-CM

## 2017-07-27 DIAGNOSIS — R06.02 SHORTNESS OF BREATH: Primary | ICD-10-CM

## 2017-07-27 PROCEDURE — 93000 ELECTROCARDIOGRAM COMPLETE: CPT | Performed by: INTERNAL MEDICINE

## 2017-07-27 PROCEDURE — 99204 OFFICE O/P NEW MOD 45 MIN: CPT | Performed by: INTERNAL MEDICINE

## 2017-07-31 NOTE — PROGRESS NOTES
Subjective:     Encounter Date:07/27/2017      Patient ID: Julio Busby is a 71 y.o. male.    Chief Complaint:  Shortness of Breath   This is a chronic problem. The current episode started more than 1 year ago. The problem has been waxing and waning. Pertinent negatives include no chest pain, headaches, leg swelling, orthopnea, PND or syncope.       71-year-old gentleman who presents today for perioperative risk assessment.  Patient had a stroke back in approximately 1996.  One point he was on Ticlid.  He has been on aspirin and Plavix for prolonged period of time.  He has now facing a potential surgery.  He is set up for myelogram.  This shortness of breath has waxed and waned over several years.  He has not had a stress test in recent years.  With that he was seen today for further evaluation.    Review of Systems   HENT: Negative for headaches.    Cardiovascular: Negative for chest pain, leg swelling, orthopnea, paroxysmal nocturnal dyspnea and syncope.   Respiratory: Positive for shortness of breath.    All other systems reviewed and are negative.        ECG 12 Lead  Date/Time: 7/27/2017 11:14 AM  Performed by: MARBELLA EMMANUEL  Authorized by: MARBELLA EMMANUEL   Interpreted by ED physician  Comparison: compared with previous ECG from 9/10/2010  Rhythm: sinus bradycardia  Conduction: LAFB  Other findings: LVH  Clinical impression: abnormal ECG               Objective:     Physical Exam   Constitutional: He is oriented to person, place, and time. He appears well-developed.   HENT:   Head: Normocephalic.   Eyes: Conjunctivae are normal.   Neck: Normal range of motion.   Cardiovascular: Normal rate, regular rhythm and normal heart sounds.    Pulmonary/Chest: Breath sounds normal.   Abdominal: Soft. Bowel sounds are normal.   Musculoskeletal: Normal range of motion. He exhibits no edema.   Neurological: He is alert and oriented to person, place, and time.   Skin: Skin is warm and dry.   Psychiatric: He has  a normal mood and affect. His behavior is normal.   Vitals reviewed.      Lab Review:       Assessment:          Diagnosis Plan   1. Shortness of breath  Adult Transthoracic Echo Complete    Stress Test With Myocardial Perfusion One Day   2. Abnormal ECG  Adult Transthoracic Echo Complete    Stress Test With Myocardial Perfusion One Day          Plan:              1.  Perioperative risk assessment.  For myelogram he really needs no further workup.  If any further surgery needed this may be reassessed.  2.  Shortness of breath.  It has been going on for prolonged period of time.  His EKG is not normal and he has quite a bit a left ventricular hypertrophy.  He also has many risk factors including hypertension hyperlipidemia known peripheral vascular disease with a history of stroke in the past.  In light of that, set up for nuclear perfusion study he is not able to walk on treadmill fast.  I also set him up for an echocardiogram to assess his LV cavity thickness as well as other structural abnormalities that could be causing the shortness of breath.  3.  Blood pressures elevated today told followed closely.  Looking through the last few documented blood pressures however they were not significantly increased we'll see where he goes with this.  4.  Patient with a previous history of alcohol abuse currently sober.  This was many years ago.    5. Pending the results of the studies further recommendations will be made.

## 2017-08-03 ENCOUNTER — HOSPITAL ENCOUNTER (OUTPATIENT)
Dept: CARDIOLOGY | Facility: HOSPITAL | Age: 72
Discharge: HOME | End: 2017-08-03
Attending: INTERNAL MEDICINE | Admitting: INTERNAL MEDICINE

## 2017-08-03 ENCOUNTER — HOSPITAL ENCOUNTER (OUTPATIENT)
Dept: CARDIOLOGY | Facility: HOSPITAL | Age: 72
Discharge: HOME | End: 2017-08-03
Attending: INTERNAL MEDICINE

## 2017-08-03 VITALS
WEIGHT: 155 LBS | DIASTOLIC BLOOD PRESSURE: 70 MMHG | HEIGHT: 69 IN | BODY MASS INDEX: 22.96 KG/M2 | SYSTOLIC BLOOD PRESSURE: 150 MMHG | HEART RATE: 44 BPM

## 2017-08-03 DIAGNOSIS — R06.02 SHORTNESS OF BREATH: ICD-10-CM

## 2017-08-03 DIAGNOSIS — R94.31 ABNORMAL ECG: ICD-10-CM

## 2017-08-03 LAB
ASCENDING AORTA: 3.8 CM
BH CV ECHO MEAS - ACS: 2.1 CM
BH CV ECHO MEAS - AO MAX PG (FULL): 5.5 MMHG
BH CV ECHO MEAS - AO MAX PG: 9 MMHG
BH CV ECHO MEAS - AO MEAN PG (FULL): 4.3 MMHG
BH CV ECHO MEAS - AO MEAN PG: 5.8 MMHG
BH CV ECHO MEAS - AO ROOT AREA (BSA CORRECTED): 1.8
BH CV ECHO MEAS - AO ROOT AREA: 8.7 CM^2
BH CV ECHO MEAS - AO ROOT DIAM: 3.3 CM
BH CV ECHO MEAS - AO V2 MAX: 149.9 CM/SEC
BH CV ECHO MEAS - AO V2 MEAN: 115.2 CM/SEC
BH CV ECHO MEAS - AO V2 VTI: 34.9 CM
BH CV ECHO MEAS - AVA(I,A): 1.9 CM^2
BH CV ECHO MEAS - AVA(I,D): 1.9 CM^2
BH CV ECHO MEAS - AVA(V,A): 2 CM^2
BH CV ECHO MEAS - AVA(V,D): 2 CM^2
BH CV ECHO MEAS - BSA(HAYCOCK): 1.9 M^2
BH CV ECHO MEAS - BSA: 1.9 M^2
BH CV ECHO MEAS - BZI_BMI: 22.9 KILOGRAMS/M^2
BH CV ECHO MEAS - BZI_METRIC_HEIGHT: 175.3 CM
BH CV ECHO MEAS - BZI_METRIC_WEIGHT: 70.3 KG
BH CV ECHO MEAS - CONTRAST EF (2CH): 65.6 ML/M^2
BH CV ECHO MEAS - CONTRAST EF 4CH: 63.9 ML/M^2
BH CV ECHO MEAS - EDV(MOD-SP2): 93 ML
BH CV ECHO MEAS - EDV(MOD-SP4): 97 ML
BH CV ECHO MEAS - EDV(TEICH): 135.4 ML
BH CV ECHO MEAS - EF(CUBED): 74.9 %
BH CV ECHO MEAS - EF(MOD-SP2): 65.6 %
BH CV ECHO MEAS - EF(MOD-SP4): 63.9 %
BH CV ECHO MEAS - EF(TEICH): 66.4 %
BH CV ECHO MEAS - ESV(MOD-SP2): 32 ML
BH CV ECHO MEAS - ESV(MOD-SP4): 35 ML
BH CV ECHO MEAS - ESV(TEICH): 45.5 ML
BH CV ECHO MEAS - FS: 37 %
BH CV ECHO MEAS - IVS/LVPW: 1
BH CV ECHO MEAS - IVSD: 1 CM
BH CV ECHO MEAS - LAT PEAK E' VEL: 11 CM/SEC
BH CV ECHO MEAS - LV DIASTOLIC VOL/BSA (35-75): 52.3 ML/M^2
BH CV ECHO MEAS - LV MASS(C)D: 195 GRAMS
BH CV ECHO MEAS - LV MASS(C)DI: 105.2 GRAMS/M^2
BH CV ECHO MEAS - LV MAX PG: 3.5 MMHG
BH CV ECHO MEAS - LV MEAN PG: 1.6 MMHG
BH CV ECHO MEAS - LV SYSTOLIC VOL/BSA (12-30): 18.9 ML/M^2
BH CV ECHO MEAS - LV V1 MAX: 93.6 CM/SEC
BH CV ECHO MEAS - LV V1 MEAN: 57.6 CM/SEC
BH CV ECHO MEAS - LV V1 VTI: 21.7 CM
BH CV ECHO MEAS - LVIDD: 5.3 CM
BH CV ECHO MEAS - LVIDS: 3.3 CM
BH CV ECHO MEAS - LVLD AP2: 7 CM
BH CV ECHO MEAS - LVLD AP4: 7.6 CM
BH CV ECHO MEAS - LVLS AP2: 6.3 CM
BH CV ECHO MEAS - LVLS AP4: 6.4 CM
BH CV ECHO MEAS - LVOT AREA (M): 3.1 CM^2
BH CV ECHO MEAS - LVOT AREA: 3.1 CM^2
BH CV ECHO MEAS - LVOT DIAM: 2 CM
BH CV ECHO MEAS - LVPWD: 0.96 CM
BH CV ECHO MEAS - MED PEAK E' VEL: 8 CM/SEC
BH CV ECHO MEAS - MV A DUR: 0.1 SEC
BH CV ECHO MEAS - MV A MAX VEL: 60.6 CM/SEC
BH CV ECHO MEAS - MV DEC SLOPE: 580.3 CM/SEC^2
BH CV ECHO MEAS - MV DEC TIME: 0.17 SEC
BH CV ECHO MEAS - MV E MAX VEL: 95.6 CM/SEC
BH CV ECHO MEAS - MV E/A: 1.6
BH CV ECHO MEAS - MV MAX PG: 3.3 MMHG
BH CV ECHO MEAS - MV MEAN PG: 1 MMHG
BH CV ECHO MEAS - MV P1/2T MAX VEL: 96 CM/SEC
BH CV ECHO MEAS - MV P1/2T: 48.5 MSEC
BH CV ECHO MEAS - MV V2 MAX: 90.2 CM/SEC
BH CV ECHO MEAS - MV V2 MEAN: 43.5 CM/SEC
BH CV ECHO MEAS - MV V2 VTI: 36.3 CM
BH CV ECHO MEAS - MVA P1/2T LCG: 2.3 CM^2
BH CV ECHO MEAS - MVA(P1/2T): 4.5 CM^2
BH CV ECHO MEAS - MVA(VTI): 1.9 CM^2
BH CV ECHO MEAS - PA MAX PG (FULL): 1.9 MMHG
BH CV ECHO MEAS - PA MAX PG: 2.8 MMHG
BH CV ECHO MEAS - PA V2 MAX: 84.2 CM/SEC
BH CV ECHO MEAS - PULM A REVS DUR: 0.12 SEC
BH CV ECHO MEAS - PULM A REVS VEL: 42.2 CM/SEC
BH CV ECHO MEAS - PULM DIAS VEL: 57.2 CM/SEC
BH CV ECHO MEAS - PULM S/D: 1.2
BH CV ECHO MEAS - PULM SYS VEL: 70.3 CM/SEC
BH CV ECHO MEAS - PVA(V,A): 2.9 CM^2
BH CV ECHO MEAS - PVA(V,D): 2.9 CM^2
BH CV ECHO MEAS - QP/QS: 1.1
BH CV ECHO MEAS - RAP SYSTOLE: 3 MMHG
BH CV ECHO MEAS - RV MAX PG: 0.98 MMHG
BH CV ECHO MEAS - RV MEAN PG: 0.71 MMHG
BH CV ECHO MEAS - RV V1 MAX: 49.5 CM/SEC
BH CV ECHO MEAS - RV V1 MEAN: 40.1 CM/SEC
BH CV ECHO MEAS - RV V1 VTI: 15 CM
BH CV ECHO MEAS - RVOT AREA: 4.9 CM^2
BH CV ECHO MEAS - RVOT DIAM: 2.5 CM
BH CV ECHO MEAS - RVSP: 32 MMHG
BH CV ECHO MEAS - SI(AO): 164.6 ML/M^2
BH CV ECHO MEAS - SI(CUBED): 60.2 ML/M^2
BH CV ECHO MEAS - SI(LVOT): 36.7 ML/M^2
BH CV ECHO MEAS - SI(MOD-SP2): 32.9 ML/M^2
BH CV ECHO MEAS - SI(MOD-SP4): 33.4 ML/M^2
BH CV ECHO MEAS - SI(TEICH): 48.5 ML/M^2
BH CV ECHO MEAS - SUP REN AO DIAM: 1.8 CM
BH CV ECHO MEAS - SV(AO): 305.1 ML
BH CV ECHO MEAS - SV(CUBED): 111.6 ML
BH CV ECHO MEAS - SV(LVOT): 68 ML
BH CV ECHO MEAS - SV(MOD-SP2): 61 ML
BH CV ECHO MEAS - SV(MOD-SP4): 62 ML
BH CV ECHO MEAS - SV(RVOT): 73.4 ML
BH CV ECHO MEAS - SV(TEICH): 89.9 ML
BH CV ECHO MEAS - TAPSE (>1.6): 2 CM2
BH CV ECHO MEAS - TR MAX VEL: 267.4 CM/SEC
BH CV NUCLEAR PRIOR STUDY: 3
BH CV STRESS BP STAGE 1: NORMAL
BH CV STRESS COMMENTS STAGE 1: NORMAL
BH CV STRESS DOSE REGADENOSON STAGE 1: 0.4
BH CV STRESS DURATION MIN STAGE 1: 0
BH CV STRESS DURATION SEC STAGE 1: 15
BH CV STRESS HR STAGE 1: 86
BH CV STRESS PROTOCOL 1: NORMAL
BH CV STRESS RECOVERY BP: NORMAL MMHG
BH CV STRESS RECOVERY HR: 75 BPM
BH CV STRESS STAGE 1: 1
BH CV XLRA - RV BASE: 3.2 CM
BH CV XLRA - TDI S': 15 CM/SEC
E/E' RATIO: 9.5
LEFT ATRIUM VOLUME INDEX: 29 ML/M2
LV EF NUC BP: 71 %
MAXIMAL PREDICTED HEART RATE: 147 BPM
PERCENT MAX PREDICTED HR: 58.5 %
SINUS: 3.1 CM
STJ: 3.7 CM
STRESS BASELINE BP: NORMAL MMHG
STRESS BASELINE HR: 42 BPM
STRESS PERCENT HR: 69 %
STRESS POST EXERCISE DUR SEC: 15 SEC
STRESS POST PEAK BP: NORMAL MMHG
STRESS POST PEAK HR: 86 BPM
STRESS TARGET HR: 125 BPM

## 2017-08-03 PROCEDURE — 78452 HT MUSCLE IMAGE SPECT MULT: CPT | Performed by: INTERNAL MEDICINE

## 2017-08-03 PROCEDURE — 93306 TTE W/DOPPLER COMPLETE: CPT | Performed by: INTERNAL MEDICINE

## 2017-08-03 PROCEDURE — A9502 TC99M TETROFOSMIN: HCPCS | Performed by: INTERNAL MEDICINE

## 2017-08-03 PROCEDURE — 25010000002 REGADENOSON 0.4 MG/5ML SOLUTION: Performed by: INTERNAL MEDICINE

## 2017-08-03 PROCEDURE — 93016 CV STRESS TEST SUPVJ ONLY: CPT | Performed by: INTERNAL MEDICINE

## 2017-08-03 PROCEDURE — 93018 CV STRESS TEST I&R ONLY: CPT | Performed by: INTERNAL MEDICINE

## 2017-08-03 PROCEDURE — 93306 TTE W/DOPPLER COMPLETE: CPT

## 2017-08-03 PROCEDURE — 0 TECHNETIUM TETROFOSMIN KIT: Performed by: INTERNAL MEDICINE

## 2017-08-03 PROCEDURE — 93017 CV STRESS TEST TRACING ONLY: CPT

## 2017-08-03 PROCEDURE — 78452 HT MUSCLE IMAGE SPECT MULT: CPT

## 2017-08-03 RX ADMIN — REGADENOSON 0.4 MG: 0.08 INJECTION, SOLUTION INTRAVENOUS at 11:40

## 2017-08-03 RX ADMIN — TETROFOSMIN 1 DOSE: 1.38 INJECTION, POWDER, LYOPHILIZED, FOR SOLUTION INTRAVENOUS at 10:30

## 2017-08-03 RX ADMIN — TETROFOSMIN 1 DOSE: 1.38 INJECTION, POWDER, LYOPHILIZED, FOR SOLUTION INTRAVENOUS at 11:40

## 2017-08-09 ENCOUNTER — HOSPITAL ENCOUNTER (OUTPATIENT)
Dept: GENERAL RADIOLOGY | Facility: HOSPITAL | Age: 72
Discharge: HOME | End: 2017-08-09

## 2017-08-09 ENCOUNTER — HOSPITAL ENCOUNTER (OUTPATIENT)
Dept: CT IMAGING | Facility: HOSPITAL | Age: 72
Discharge: HOME | End: 2017-08-09
Admitting: NURSE PRACTITIONER

## 2017-08-09 VITALS
SYSTOLIC BLOOD PRESSURE: 159 MMHG | DIASTOLIC BLOOD PRESSURE: 70 MMHG | WEIGHT: 158 LBS | TEMPERATURE: 96.9 F | RESPIRATION RATE: 16 BRPM | HEART RATE: 43 BPM | OXYGEN SATURATION: 99 % | BODY MASS INDEX: 23.95 KG/M2 | HEIGHT: 68 IN

## 2017-08-09 DIAGNOSIS — M43.16 SPONDYLOLISTHESIS AT L4-L5 LEVEL: ICD-10-CM

## 2017-08-09 DIAGNOSIS — M48.062 SPINAL STENOSIS, LUMBAR REGION, WITH NEUROGENIC CLAUDICATION: ICD-10-CM

## 2017-08-09 PROCEDURE — 72131 CT LUMBAR SPINE W/O DYE: CPT

## 2017-08-09 PROCEDURE — 72240 MYELOGRAPHY NECK SPINE: CPT

## 2017-08-09 PROCEDURE — 0 IOPAMIDOL 41 % SOLUTION: Performed by: RADIOLOGY

## 2017-08-09 RX ORDER — LIDOCAINE HYDROCHLORIDE 10 MG/ML
10 INJECTION, SOLUTION INFILTRATION; PERINEURAL ONCE
Status: COMPLETED | OUTPATIENT
Start: 2017-08-09 | End: 2017-08-09

## 2017-08-09 RX ADMIN — IOPAMIDOL 20 ML: 408 INJECTION, SOLUTION INTRATHECAL at 10:13

## 2017-08-09 RX ADMIN — LIDOCAINE HYDROCHLORIDE 2 ML: 10 INJECTION, SOLUTION INFILTRATION; PERINEURAL at 10:12

## 2017-08-09 NOTE — NURSING NOTE
D/C instructions discussed and understood by patient and family member. No distress. Eating lunch.

## 2017-08-09 NOTE — NURSING NOTE
Returned to triage. Dressing to low back dry and intact. No complaint of headache. No distress. Fluids encouraged. Breakfast served.

## 2017-08-09 NOTE — DISCHARGE INSTRUCTIONS
EDUCATION /DISCHARGE INSTRUCTIONS:  A myelogram is a special radiology procedure of the spinal cord, spinal nerves and other related structures.  You will be awake during the examination.  An area of your lower back will be cleansed with an antiseptic solution.  The physician will inject a numbing medication in your lower back.  While your back is numb, a needle will be placed in the lower back area.  A small amount of spinal fluid may be withdrawn and sent to the lab if ordered by your physician. While the needle is in the back, an injection of a contrast material (xray dye) will be given through the needle.  The contrast material will allow the physician to see the spinal cord and spinal nerves.  Once injected, the needle will be removed and a band aid will be placed over the injection site.  The table will be tilted during the process to allow the contrast material to flow to particular areas in the spine.  Following the injection and xrays, you will be taken to the CT scan where more pictures will be taken. After the procedure is finished, the contrast material will be absorbed by your body and eliminated through your kidneys.  The radiologist will study and interpret your myelogram and send the results to your physician.    Procedure risks of a myelogram include, but are not limited to:  *  Bleeding   *  seizure  *  Infection   *  Headache, possibly severe requiring  *  Contrast reaction      a blood patch  *  Nerve or cord injury  *  Paralysis and death    Benefits of the procedure:  *  Best examination for delineating pathology related to spinal cord compression from a disc and/or nerve root compression    Alternatives to the procedure:  MRI - a non invasive procedure requiring intravenous contrast injection.  Cannot be done on patients with certain pacemakers or metal in the body.  MRI risks include possible reaction to the contrast material, movement of metal located in the body.  Benefit to MRI:   Non-invasive and usually painless procedure.  THIS EDUCATION INFORMATION WAS REVIEWED PRIOR TO THE PROCEDURE AND CONSENT. Patient initials __________________Time_________________    Important information following your myelogram:  *  Lie down with your head elevated no more than 2 pillows high today & tonight  *  Tomorrow, lie down with 1 pillow all day and all night  *  Sit up to eat meals and use the bathroom, otherwise, lie down  *  Do not drive for 48 hours following a myelogram  *  You may remove the bandage and shower in the morning  *  Increase your fluids for the next 24 hours.  Caffeinated drinks are encouraged.     Resume taking your blood thinner or Aspirin on __NO ASPIRIN FOR 24 HOURS FOLLOWING THE PROCEDURE  MAY RESTART PLAVIX ON Thursday 8/10/17 AFTER 12NOON      If you have problems with a headache that is not relieved with rest and medication, please call the Radiology Triage Nurse desk  [phone]

## 2017-08-10 ENCOUNTER — TELEPHONE (OUTPATIENT)
Dept: INTERVENTIONAL RADIOLOGY/VASCULAR | Facility: HOSPITAL | Age: 72
End: 2017-08-10

## 2017-08-17 ENCOUNTER — TELEPHONE (OUTPATIENT)
Dept: FAMILY MEDICINE CLINIC | Facility: CLINIC | Age: 72
End: 2017-08-17

## 2017-08-17 NOTE — TELEPHONE ENCOUNTER
----- Message from Sidney Brandt MD sent at 8/16/2017  1:05 PM EDT -----  , neurology, called and let me know that his B12 is 132. Please reach out to pt and start him on B12 injections, 1,000mmcg q WEEK x 4 then q monthly forever.

## 2017-08-22 ENCOUNTER — CLINICAL SUPPORT (OUTPATIENT)
Dept: FAMILY MEDICINE CLINIC | Facility: CLINIC | Age: 72
End: 2017-08-22

## 2017-08-22 DIAGNOSIS — E53.8 B12 DEFICIENCY: Primary | ICD-10-CM

## 2017-08-22 PROCEDURE — 96372 THER/PROPH/DIAG INJ SC/IM: CPT | Performed by: FAMILY MEDICINE

## 2017-08-22 RX ORDER — CYANOCOBALAMIN 1000 UG/ML
1000 INJECTION, SOLUTION INTRAMUSCULAR; SUBCUTANEOUS WEEKLY
Status: SHIPPED | OUTPATIENT
Start: 2017-08-22

## 2017-08-22 RX ADMIN — CYANOCOBALAMIN 1000 MCG: 1000 INJECTION, SOLUTION INTRAMUSCULAR; SUBCUTANEOUS at 09:48

## 2017-08-23 ENCOUNTER — OFFICE VISIT (OUTPATIENT)
Dept: NEUROSURGERY | Facility: CLINIC | Age: 72
End: 2017-08-23

## 2017-08-23 ENCOUNTER — TRANSCRIBE ORDERS (OUTPATIENT)
Dept: NEUROSURGERY | Facility: CLINIC | Age: 72
End: 2017-08-23

## 2017-08-23 VITALS
BODY MASS INDEX: 23.34 KG/M2 | HEART RATE: 56 BPM | DIASTOLIC BLOOD PRESSURE: 92 MMHG | HEIGHT: 68 IN | SYSTOLIC BLOOD PRESSURE: 142 MMHG | WEIGHT: 154 LBS

## 2017-08-23 DIAGNOSIS — M48.062 SPINAL STENOSIS, LUMBAR REGION, WITH NEUROGENIC CLAUDICATION: Primary | ICD-10-CM

## 2017-08-23 DIAGNOSIS — Z86.73 HX OF CEREBRAL INFARCTION: ICD-10-CM

## 2017-08-23 DIAGNOSIS — M43.16 SPONDYLOLISTHESIS AT L4-L5 LEVEL: ICD-10-CM

## 2017-08-23 PROCEDURE — 99214 OFFICE O/P EST MOD 30 MIN: CPT | Performed by: NEUROLOGICAL SURGERY

## 2017-08-23 RX ORDER — DONEPEZIL HYDROCHLORIDE 10 MG/1
10 TABLET, FILM COATED ORAL NIGHTLY
COMMUNITY
Start: 2017-08-22

## 2017-08-23 NOTE — PROGRESS NOTES
Subjective   Patient ID: Julio Busby is a 71 y.o. male is here today for follow-up of back pain with new CT myelogram of the lumbar spine.    The patient denies any changes to their symptoms since their last visit.  The patient denies any issues following their myelogram.    Back Pain   This is a chronic problem. The current episode started more than 1 month ago. The problem occurs daily. The problem is unchanged. Pertinent negatives include no fever.       The following portions of the patient's history were reviewed and updated as appropriate: allergies, current medications, past family history, past medical history, past social history, past surgical history and problem list.    Review of Systems   Constitutional: Negative for fever.   Musculoskeletal: Positive for back pain.   Neurological: Negative for syncope.   All other systems reviewed and are negative.    He is with his wife.  He is a retired .  She's had a year of low back pain and bilateral buttock and posterior thigh pain, right equal to left.  No motor deficits.  The history is consistent with neurogenic claudication.  He got cardiac clearance.  He has a history of a stroke and is on Plavix which can be stopped.  It turned out he didn't have much carotid stenosis.  We went over the myelogram.  Blocks have been tried and did not help.  Physical therapy did not help.  He is quite miserable and wants to be able to do some gardening and play golf eventually.  We discussed doing a lumbar decompression and fusion at L4-L5 with pedicle screw fixation.  He would like to proceed with this in a timely fashion.  He probably will be able to go home with home health services.  Alternatively subacute rehabilitation may need to be considered.      Objective   Physical Exam   Constitutional: He is oriented to person, place, and time. He appears well-developed and well-nourished.   HENT:   Head: Normocephalic and atraumatic.   Eyes: Conjunctivae  and EOM are normal. Pupils are equal, round, and reactive to light.   Fundoscopic exam:       The right eye shows no papilledema. The right eye shows venous pulsations.        The left eye shows no papilledema. The left eye shows venous pulsations.   Neck: Carotid bruit is not present.   Neurological: He is oriented to person, place, and time. He has a normal Finger-Nose-Finger Test and a normal Heel to Shin Test. Gait normal.   Reflex Scores:       Tricep reflexes are 2+ on the right side and 2+ on the left side.       Bicep reflexes are 2+ on the right side and 2+ on the left side.       Brachioradialis reflexes are 2+ on the right side and 2+ on the left side.       Patellar reflexes are 2+ on the right side and 2+ on the left side.       Achilles reflexes are 2+ on the right side and 2+ on the left side.  Psychiatric: His speech is normal.     Neurologic Exam     Mental Status   Oriented to person, place, and time.   Registration of memory: Good recent and remote memory.   Attention: normal. Concentration: normal.   Speech: speech is normal   Level of consciousness: alert  Knowledge: consistent with education.     Cranial Nerves     CN II   Visual fields full to confrontation.   Visual acuity: normal    CN III, IV, VI   Pupils are equal, round, and reactive to light.  Extraocular motions are normal.     CN V   Facial sensation intact.   Right corneal reflex: normal  Left corneal reflex: normal    CN VII   Facial expression full, symmetric.   Right facial weakness: none  Left facial weakness: none    CN VIII   Hearing: intact    CN IX, X   Palate: symmetric    CN XI   Right sternocleidomastoid strength: normal  Left sternocleidomastoid strength: normal    CN XII   Tongue: not atrophic  Tongue deviation: none    Motor Exam   Muscle bulk: normal  Right arm tone: normal  Left arm tone: normal  Right leg tone: normal  Left leg tone: normal    Strength   Strength 5/5 except as noted.     Sensory Exam   Light touch  normal.     Gait, Coordination, and Reflexes     Gait  Gait: normal    Coordination   Finger to nose coordination: normal  Heel to shin coordination: normal    Reflexes   Right brachioradialis: 2+  Left brachioradialis: 2+  Right biceps: 2+  Left biceps: 2+  Right triceps: 2+  Left triceps: 2+  Right patellar: 2+  Left patellar: 2+  Right achilles: 2+  Left achilles: 2+  Right : 2+  Left : 2+      Assessment/Plan   Independent Review of Radiographic Studies:    The recently completed myelogram did show rather severe stenosis at L4-L5 and a degenerative listhesis at the same level.  The other levels actually look okay.  Agree with the report.      Medical Decision Making:    Cardiac clearance has been obtained.  Plavix will need to be stopped.  I described and recommended a miniopen L4/5  lumbar decompression and fusion with posterior lumbar interbody fusion (PLIF) and Sextant pedicle screw fixation. The goal of surgery is relief of radiating leg pain, improvement of numbness, tingling, and weakness, and reduction in overall low back pain. The risks include, but are not limited to, infection, hemorrhage requiring transfusion or reoperation, CSF leak requiring reoperation, incomplete relief of symptoms, psuedoarthrosis resulting in chronic low back pain, hardward problems requiring revision or removal, potential need for additional surgery in the future, stroke, paralysis, coma, and death. The patient agrees to proceed.       Julio was seen today for back pain.    Diagnoses and all orders for this visit:    Spinal stenosis, lumbar region, with neurogenic claudication  -     Case request; Standing  -     Case request    Spondylolisthesis at L4-L5 level  -     Case request; Standing  -     Case request    Hx of cerebral infarction    Return for 2 weeks with [facility].               Addendum: I got a call about him from Dr. Bolivar Becerril who is seeing him for his poor memory. He was concerned about possible amyloid  angiopathy seen on a brain MRI. There were also some possible upper cervical regions of stenosis. We'll bring him in next week to talk about it. He is having a fusion next week also.

## 2017-08-25 ENCOUNTER — TELEPHONE (OUTPATIENT)
Dept: NEUROSURGERY | Facility: CLINIC | Age: 72
End: 2017-08-25

## 2017-08-25 NOTE — TELEPHONE ENCOUNTER
Patient's wife called and states that the patient was seen by his PCP who referred the patient to Neurology for his memory loss. They did some test and his B12 levels are very low. He is now taking B12 injections. She wants to make sure this will not affect his surgery in any way. She was also concerned with another physician saying he needs his labs done prior to ensure his blood levels are safe for surgery. I explained that PAT will take care of all that when he goes. They will draw labs, more than likely do an EKG given his age and if anything is abnormal there we will know ahead of time. She feels more comfortable with that but wants to ensure the B12 injections will be ok for surgery.

## 2017-08-28 ENCOUNTER — APPOINTMENT (OUTPATIENT)
Dept: PREADMISSION TESTING | Facility: HOSPITAL | Age: 72
End: 2017-08-28

## 2017-08-28 VITALS
BODY MASS INDEX: 23.04 KG/M2 | HEART RATE: 50 BPM | DIASTOLIC BLOOD PRESSURE: 79 MMHG | SYSTOLIC BLOOD PRESSURE: 183 MMHG | WEIGHT: 152 LBS | OXYGEN SATURATION: 99 % | HEIGHT: 68 IN | RESPIRATION RATE: 16 BRPM | TEMPERATURE: 97.7 F

## 2017-08-28 LAB
ABO GROUP BLD: NORMAL
ANION GAP SERPL CALCULATED.3IONS-SCNC: 14.5 MMOL/L
APTT PPP: 28.3 SECONDS (ref 22.7–35.4)
BACTERIA UR QL AUTO: NORMAL /HPF
BASOPHILS # BLD AUTO: 0.01 10*3/MM3 (ref 0–0.2)
BASOPHILS NFR BLD AUTO: 0.2 % (ref 0–1.5)
BILIRUB UR QL STRIP: NEGATIVE
BLD GP AB SCN SERPL QL: NEGATIVE
BUN BLD-MCNC: 21 MG/DL (ref 8–23)
BUN/CREAT SERPL: 22.1 (ref 7–25)
CALCIUM SPEC-SCNC: 9.9 MG/DL (ref 8.6–10.5)
CHLORIDE SERPL-SCNC: 98 MMOL/L (ref 98–107)
CLARITY UR: CLEAR
CO2 SERPL-SCNC: 26.5 MMOL/L (ref 22–29)
COLOR UR: YELLOW
CREAT BLD-MCNC: 0.95 MG/DL (ref 0.76–1.27)
DEPRECATED RDW RBC AUTO: 43.6 FL (ref 37–54)
EOSINOPHIL # BLD AUTO: 0.1 10*3/MM3 (ref 0–0.7)
EOSINOPHIL NFR BLD AUTO: 1.8 % (ref 0.3–6.2)
ERYTHROCYTE [DISTWIDTH] IN BLOOD BY AUTOMATED COUNT: 13.1 % (ref 11.5–14.5)
GFR SERPL CREATININE-BSD FRML MDRD: 78 ML/MIN/1.73
GLUCOSE BLD-MCNC: 98 MG/DL (ref 65–99)
GLUCOSE UR STRIP-MCNC: NEGATIVE MG/DL
HCT VFR BLD AUTO: 43.9 % (ref 40.4–52.2)
HGB BLD-MCNC: 14.2 G/DL (ref 13.7–17.6)
HGB UR QL STRIP.AUTO: NEGATIVE
HYALINE CASTS UR QL AUTO: NORMAL /LPF
IMM GRANULOCYTES # BLD: 0 10*3/MM3 (ref 0–0.03)
IMM GRANULOCYTES NFR BLD: 0 % (ref 0–0.5)
INR PPP: 0.97 (ref 0.9–1.1)
KETONES UR QL STRIP: NEGATIVE
LEUKOCYTE ESTERASE UR QL STRIP.AUTO: NEGATIVE
LYMPHOCYTES # BLD AUTO: 1.88 10*3/MM3 (ref 0.9–4.8)
LYMPHOCYTES NFR BLD AUTO: 33.5 % (ref 19.6–45.3)
MCH RBC QN AUTO: 29.5 PG (ref 27–32.7)
MCHC RBC AUTO-ENTMCNC: 32.3 G/DL (ref 32.6–36.4)
MCV RBC AUTO: 91.3 FL (ref 79.8–96.2)
MONOCYTES # BLD AUTO: 0.6 10*3/MM3 (ref 0.2–1.2)
MONOCYTES NFR BLD AUTO: 10.7 % (ref 5–12)
NEUTROPHILS # BLD AUTO: 3.03 10*3/MM3 (ref 1.9–8.1)
NEUTROPHILS NFR BLD AUTO: 53.8 % (ref 42.7–76)
NITRITE UR QL STRIP: NEGATIVE
PH UR STRIP.AUTO: 6.5 [PH] (ref 5–8)
PLATELET # BLD AUTO: 202 10*3/MM3 (ref 140–500)
PMV BLD AUTO: 10.5 FL (ref 6–12)
POTASSIUM BLD-SCNC: 4.3 MMOL/L (ref 3.5–5.2)
PROT UR QL STRIP: NEGATIVE
PROTHROMBIN TIME: 12.5 SECONDS (ref 11.7–14.2)
RBC # BLD AUTO: 4.81 10*6/MM3 (ref 4.6–6)
RBC # UR: NORMAL /HPF
REF LAB TEST METHOD: NORMAL
RH BLD: POSITIVE
SODIUM BLD-SCNC: 139 MMOL/L (ref 136–145)
SP GR UR STRIP: 1.02 (ref 1–1.03)
SQUAMOUS #/AREA URNS HPF: NORMAL /HPF
UROBILINOGEN UR QL STRIP: NORMAL
WBC NRBC COR # BLD: 5.62 10*3/MM3 (ref 4.5–10.7)
WBC UR QL AUTO: NORMAL /HPF

## 2017-08-28 PROCEDURE — 86900 BLOOD TYPING SEROLOGIC ABO: CPT | Performed by: NEUROLOGICAL SURGERY

## 2017-08-28 PROCEDURE — 85730 THROMBOPLASTIN TIME PARTIAL: CPT | Performed by: NEUROLOGICAL SURGERY

## 2017-08-28 PROCEDURE — 36415 COLL VENOUS BLD VENIPUNCTURE: CPT

## 2017-08-28 PROCEDURE — 80048 BASIC METABOLIC PNL TOTAL CA: CPT | Performed by: NEUROLOGICAL SURGERY

## 2017-08-28 PROCEDURE — 85610 PROTHROMBIN TIME: CPT | Performed by: NEUROLOGICAL SURGERY

## 2017-08-28 PROCEDURE — 86850 RBC ANTIBODY SCREEN: CPT | Performed by: NEUROLOGICAL SURGERY

## 2017-08-28 PROCEDURE — 81001 URINALYSIS AUTO W/SCOPE: CPT | Performed by: NEUROLOGICAL SURGERY

## 2017-08-28 PROCEDURE — 85025 COMPLETE CBC W/AUTO DIFF WBC: CPT | Performed by: NEUROLOGICAL SURGERY

## 2017-08-28 PROCEDURE — 86901 BLOOD TYPING SEROLOGIC RH(D): CPT | Performed by: NEUROLOGICAL SURGERY

## 2017-08-29 ENCOUNTER — CLINICAL SUPPORT (OUTPATIENT)
Dept: FAMILY MEDICINE CLINIC | Facility: CLINIC | Age: 72
End: 2017-08-29

## 2017-08-29 DIAGNOSIS — E53.8 B12 DEFICIENCY: Primary | ICD-10-CM

## 2017-08-29 PROCEDURE — 96372 THER/PROPH/DIAG INJ SC/IM: CPT | Performed by: FAMILY MEDICINE

## 2017-08-29 RX ADMIN — CYANOCOBALAMIN 1000 MCG: 1000 INJECTION, SOLUTION INTRAMUSCULAR; SUBCUTANEOUS at 08:15

## 2017-08-31 ENCOUNTER — OFFICE VISIT (OUTPATIENT)
Dept: NEUROSURGERY | Facility: CLINIC | Age: 72
End: 2017-08-31

## 2017-08-31 VITALS
DIASTOLIC BLOOD PRESSURE: 90 MMHG | HEART RATE: 41 BPM | BODY MASS INDEX: 23.34 KG/M2 | SYSTOLIC BLOOD PRESSURE: 149 MMHG | HEIGHT: 68 IN | WEIGHT: 154 LBS

## 2017-08-31 DIAGNOSIS — I68.0 CEREBRAL AMYLOID ANGIOPATHY (HCC): Primary | ICD-10-CM

## 2017-08-31 DIAGNOSIS — E85.4 CEREBRAL AMYLOID ANGIOPATHY (HCC): Primary | ICD-10-CM

## 2017-08-31 DIAGNOSIS — Z86.73 HX OF CEREBRAL INFARCTION: ICD-10-CM

## 2017-08-31 PROCEDURE — 99213 OFFICE O/P EST LOW 20 MIN: CPT | Performed by: NEUROLOGICAL SURGERY

## 2017-09-05 ENCOUNTER — CLINICAL SUPPORT (OUTPATIENT)
Dept: FAMILY MEDICINE CLINIC | Facility: CLINIC | Age: 72
End: 2017-09-05

## 2017-09-05 DIAGNOSIS — E53.8 B12 DEFICIENCY: Primary | ICD-10-CM

## 2017-09-05 PROCEDURE — 96372 THER/PROPH/DIAG INJ SC/IM: CPT | Performed by: FAMILY MEDICINE

## 2017-09-05 RX ADMIN — CYANOCOBALAMIN 1000 MCG: 1000 INJECTION, SOLUTION INTRAMUSCULAR; SUBCUTANEOUS at 11:42

## 2017-09-07 ENCOUNTER — ANESTHESIA (OUTPATIENT)
Dept: PERIOP | Facility: HOSPITAL | Age: 72
End: 2017-09-07

## 2017-09-07 ENCOUNTER — HOSPITAL ENCOUNTER (INPATIENT)
Facility: HOSPITAL | Age: 72
LOS: 3 days | Discharge: HOME | End: 2017-09-10
Attending: NEUROLOGICAL SURGERY | Admitting: NEUROLOGICAL SURGERY

## 2017-09-07 ENCOUNTER — APPOINTMENT (OUTPATIENT)
Dept: GENERAL RADIOLOGY | Facility: HOSPITAL | Age: 72
End: 2017-09-07

## 2017-09-07 ENCOUNTER — ANESTHESIA EVENT (OUTPATIENT)
Dept: PERIOP | Facility: HOSPITAL | Age: 72
End: 2017-09-07

## 2017-09-07 DIAGNOSIS — M48.061 STENOSIS, SPINAL, LUMBAR: ICD-10-CM

## 2017-09-07 DIAGNOSIS — R26.2 DIFFICULTY WALKING: Primary | ICD-10-CM

## 2017-09-07 PROCEDURE — 0SB20ZZ EXCISION OF LUMBAR VERTEBRAL DISC, OPEN APPROACH: ICD-10-PCS | Performed by: NEUROLOGICAL SURGERY

## 2017-09-07 PROCEDURE — 01NB0ZZ RELEASE LUMBAR NERVE, OPEN APPROACH: ICD-10-PCS | Performed by: NEUROLOGICAL SURGERY

## 2017-09-07 PROCEDURE — 25010000003 CEFAZOLIN IN DEXTROSE 2-4 GM/100ML-% SOLUTION: Performed by: NEUROLOGICAL SURGERY

## 2017-09-07 PROCEDURE — 25010000002 NEOSTIGMINE PER 0.5 MG: Performed by: NURSE ANESTHETIST, CERTIFIED REGISTERED

## 2017-09-07 PROCEDURE — 25010000002 FENTANYL CITRATE (PF) 100 MCG/2ML SOLUTION: Performed by: NURSE ANESTHETIST, CERTIFIED REGISTERED

## 2017-09-07 PROCEDURE — 0SG00A1 FUSION LUM JT W INTBD FUS DEV, POST APPR P COL, OPEN: ICD-10-PCS | Performed by: NEUROLOGICAL SURGERY

## 2017-09-07 PROCEDURE — 25010000002 METHOCARBAMOL 1000 MG/10ML SOLUTION 10 ML VIAL: Performed by: NEUROLOGICAL SURGERY

## 2017-09-07 PROCEDURE — 25010000002 ONDANSETRON PER 1 MG: Performed by: NURSE ANESTHETIST, CERTIFIED REGISTERED

## 2017-09-07 PROCEDURE — 72100 X-RAY EXAM L-S SPINE 2/3 VWS: CPT

## 2017-09-07 PROCEDURE — 25010000002 PHENYLEPHRINE PER 1 ML: Performed by: NURSE ANESTHETIST, CERTIFIED REGISTERED

## 2017-09-07 PROCEDURE — C1713 ANCHOR/SCREW BN/BN,TIS/BN: HCPCS | Performed by: NEUROLOGICAL SURGERY

## 2017-09-07 PROCEDURE — 25010000002 HYDROMORPHONE PER 4 MG: Performed by: NURSE ANESTHETIST, CERTIFIED REGISTERED

## 2017-09-07 PROCEDURE — 25010000003 CEFAZOLIN PER 500 MG: Performed by: NEUROLOGICAL SURGERY

## 2017-09-07 PROCEDURE — 22840 INSERT SPINE FIXATION DEVICE: CPT | Performed by: NEUROLOGICAL SURGERY

## 2017-09-07 PROCEDURE — 22853 INSJ BIOMECHANICAL DEVICE: CPT | Performed by: NEUROLOGICAL SURGERY

## 2017-09-07 PROCEDURE — 76000 FLUOROSCOPY <1 HR PHYS/QHP: CPT

## 2017-09-07 PROCEDURE — 25010000002 ONDANSETRON PER 1 MG: Performed by: NEUROLOGICAL SURGERY

## 2017-09-07 PROCEDURE — 25010000002 PROPOFOL 10 MG/ML EMULSION: Performed by: NURSE ANESTHETIST, CERTIFIED REGISTERED

## 2017-09-07 PROCEDURE — 94799 UNLISTED PULMONARY SVC/PX: CPT

## 2017-09-07 PROCEDURE — 22630 ARTHRD PST TQ 1NTRSPC LUM: CPT | Performed by: NEUROLOGICAL SURGERY

## 2017-09-07 PROCEDURE — 25010000002 HEPARIN (PORCINE) PER 1000 UNITS: Performed by: NEUROLOGICAL SURGERY

## 2017-09-07 DEVICE — PUTTY DBM GRAFTON 6CC: Type: IMPLANTABLE DEVICE | Site: SPINE LUMBAR | Status: FUNCTIONAL

## 2017-09-07 DEVICE — SCREW 54840016555 CN MAS 6.5X55 CC
Type: IMPLANTABLE DEVICE | Site: SPINE LUMBAR | Status: FUNCTIONAL
Brand: CD HORIZON® SPINAL SYSTEM

## 2017-09-07 DEVICE — SPACER 3992610 26MM X 10MM
Type: IMPLANTABLE DEVICE | Site: SPINE LUMBAR | Status: FUNCTIONAL
Brand: CAPSTONE PTC™ SPINAL SYSTEM

## 2017-09-07 RX ORDER — ONDANSETRON 2 MG/ML
4 INJECTION INTRAMUSCULAR; INTRAVENOUS ONCE AS NEEDED
Status: DISCONTINUED | OUTPATIENT
Start: 2017-09-07 | End: 2017-09-07 | Stop reason: HOSPADM

## 2017-09-07 RX ORDER — LABETALOL HYDROCHLORIDE 5 MG/ML
5 INJECTION, SOLUTION INTRAVENOUS
Status: DISCONTINUED | OUTPATIENT
Start: 2017-09-07 | End: 2017-09-07 | Stop reason: HOSPADM

## 2017-09-07 RX ORDER — SODIUM CHLORIDE 9 MG/ML
INJECTION, SOLUTION INTRAVENOUS CONTINUOUS PRN
Status: DISCONTINUED | OUTPATIENT
Start: 2017-09-07 | End: 2017-09-07 | Stop reason: SURG

## 2017-09-07 RX ORDER — PROMETHAZINE HYDROCHLORIDE 25 MG/ML
12.5 INJECTION, SOLUTION INTRAMUSCULAR; INTRAVENOUS EVERY 6 HOURS PRN
Status: DISCONTINUED | OUTPATIENT
Start: 2017-09-07 | End: 2017-09-07

## 2017-09-07 RX ORDER — FAMOTIDINE 10 MG/ML
20 INJECTION, SOLUTION INTRAVENOUS ONCE
Status: COMPLETED | OUTPATIENT
Start: 2017-09-07 | End: 2017-09-07

## 2017-09-07 RX ORDER — FENTANYL CITRATE 50 UG/ML
INJECTION, SOLUTION INTRAMUSCULAR; INTRAVENOUS AS NEEDED
Status: DISCONTINUED | OUTPATIENT
Start: 2017-09-07 | End: 2017-09-07 | Stop reason: SURG

## 2017-09-07 RX ORDER — PROMETHAZINE HYDROCHLORIDE 25 MG/ML
12.5 INJECTION, SOLUTION INTRAMUSCULAR; INTRAVENOUS ONCE AS NEEDED
Status: DISCONTINUED | OUTPATIENT
Start: 2017-09-07 | End: 2017-09-07 | Stop reason: HOSPADM

## 2017-09-07 RX ORDER — ONDANSETRON 2 MG/ML
INJECTION INTRAMUSCULAR; INTRAVENOUS AS NEEDED
Status: DISCONTINUED | OUTPATIENT
Start: 2017-09-07 | End: 2017-09-07 | Stop reason: SURG

## 2017-09-07 RX ORDER — ACETAMINOPHEN 325 MG/1
650 TABLET ORAL EVERY 4 HOURS PRN
Status: DISCONTINUED | OUTPATIENT
Start: 2017-09-07 | End: 2017-09-10 | Stop reason: HOSPADM

## 2017-09-07 RX ORDER — PROMETHAZINE HYDROCHLORIDE 12.5 MG/1
12.5 TABLET ORAL EVERY 6 HOURS PRN
Status: DISCONTINUED | OUTPATIENT
Start: 2017-09-07 | End: 2017-09-07

## 2017-09-07 RX ORDER — SODIUM CHLORIDE 0.9 % (FLUSH) 0.9 %
1-10 SYRINGE (ML) INJECTION AS NEEDED
Status: DISCONTINUED | OUTPATIENT
Start: 2017-09-07 | End: 2017-09-10 | Stop reason: HOSPADM

## 2017-09-07 RX ORDER — NALOXONE HCL 0.4 MG/ML
0.2 VIAL (ML) INJECTION AS NEEDED
Status: DISCONTINUED | OUTPATIENT
Start: 2017-09-07 | End: 2017-09-07 | Stop reason: HOSPADM

## 2017-09-07 RX ORDER — PROMETHAZINE HYDROCHLORIDE 12.5 MG/1
12.5 SUPPOSITORY RECTAL EVERY 6 HOURS PRN
Status: DISCONTINUED | OUTPATIENT
Start: 2017-09-07 | End: 2017-09-07

## 2017-09-07 RX ORDER — EPHEDRINE SULFATE 50 MG/ML
INJECTION, SOLUTION INTRAVENOUS AS NEEDED
Status: DISCONTINUED | OUTPATIENT
Start: 2017-09-07 | End: 2017-09-07 | Stop reason: SURG

## 2017-09-07 RX ORDER — HYDROMORPHONE HCL 110MG/55ML
PATIENT CONTROLLED ANALGESIA SYRINGE INTRAVENOUS AS NEEDED
Status: DISCONTINUED | OUTPATIENT
Start: 2017-09-07 | End: 2017-09-07 | Stop reason: SURG

## 2017-09-07 RX ORDER — PROMETHAZINE HYDROCHLORIDE 25 MG/1
25 SUPPOSITORY RECTAL ONCE AS NEEDED
Status: DISCONTINUED | OUTPATIENT
Start: 2017-09-07 | End: 2017-09-07 | Stop reason: HOSPADM

## 2017-09-07 RX ORDER — ROCURONIUM BROMIDE 10 MG/ML
INJECTION, SOLUTION INTRAVENOUS AS NEEDED
Status: DISCONTINUED | OUTPATIENT
Start: 2017-09-07 | End: 2017-09-07 | Stop reason: SURG

## 2017-09-07 RX ORDER — HYDROMORPHONE HYDROCHLORIDE 1 MG/ML
0.5 INJECTION, SOLUTION INTRAMUSCULAR; INTRAVENOUS; SUBCUTANEOUS
Status: DISCONTINUED | OUTPATIENT
Start: 2017-09-07 | End: 2017-09-07 | Stop reason: HOSPADM

## 2017-09-07 RX ORDER — CEFAZOLIN SODIUM 2 G/100ML
2 INJECTION, SOLUTION INTRAVENOUS ONCE
Status: COMPLETED | OUTPATIENT
Start: 2017-09-07 | End: 2017-09-07

## 2017-09-07 RX ORDER — EPHEDRINE SULFATE 50 MG/ML
5 INJECTION, SOLUTION INTRAVENOUS ONCE AS NEEDED
Status: DISCONTINUED | OUTPATIENT
Start: 2017-09-07 | End: 2017-09-07 | Stop reason: HOSPADM

## 2017-09-07 RX ORDER — DIPHENHYDRAMINE HYDROCHLORIDE 50 MG/ML
12.5 INJECTION INTRAMUSCULAR; INTRAVENOUS
Status: DISCONTINUED | OUTPATIENT
Start: 2017-09-07 | End: 2017-09-07 | Stop reason: HOSPADM

## 2017-09-07 RX ORDER — FLUMAZENIL 0.1 MG/ML
0.2 INJECTION INTRAVENOUS AS NEEDED
Status: DISCONTINUED | OUTPATIENT
Start: 2017-09-07 | End: 2017-09-07 | Stop reason: HOSPADM

## 2017-09-07 RX ORDER — PROMETHAZINE HYDROCHLORIDE 25 MG/1
25 TABLET ORAL ONCE AS NEEDED
Status: DISCONTINUED | OUTPATIENT
Start: 2017-09-07 | End: 2017-09-07 | Stop reason: HOSPADM

## 2017-09-07 RX ORDER — SODIUM CHLORIDE, SODIUM LACTATE, POTASSIUM CHLORIDE, CALCIUM CHLORIDE 600; 310; 30; 20 MG/100ML; MG/100ML; MG/100ML; MG/100ML
9 INJECTION, SOLUTION INTRAVENOUS CONTINUOUS
Status: DISCONTINUED | OUTPATIENT
Start: 2017-09-07 | End: 2017-09-07

## 2017-09-07 RX ORDER — NALOXONE HCL 0.4 MG/ML
0.1 VIAL (ML) INJECTION
Status: DISCONTINUED | OUTPATIENT
Start: 2017-09-07 | End: 2017-09-07 | Stop reason: HOSPADM

## 2017-09-07 RX ORDER — PROMETHAZINE HYDROCHLORIDE 25 MG/ML
12.5 INJECTION, SOLUTION INTRAMUSCULAR; INTRAVENOUS EVERY 6 HOURS PRN
Status: CANCELLED | OUTPATIENT
Start: 2017-09-07

## 2017-09-07 RX ORDER — DOCUSATE SODIUM 100 MG/1
100 CAPSULE, LIQUID FILLED ORAL 2 TIMES DAILY PRN
Status: DISCONTINUED | OUTPATIENT
Start: 2017-09-07 | End: 2017-09-08

## 2017-09-07 RX ORDER — LISINOPRIL 10 MG/1
10 TABLET ORAL DAILY
Status: DISCONTINUED | OUTPATIENT
Start: 2017-09-07 | End: 2017-09-10 | Stop reason: HOSPADM

## 2017-09-07 RX ORDER — CYCLOBENZAPRINE HCL 10 MG
10 TABLET ORAL 3 TIMES DAILY PRN
Status: DISCONTINUED | OUTPATIENT
Start: 2017-09-07 | End: 2017-09-10 | Stop reason: HOSPADM

## 2017-09-07 RX ORDER — SODIUM CHLORIDE 0.9 % (FLUSH) 0.9 %
1-10 SYRINGE (ML) INJECTION AS NEEDED
Status: DISCONTINUED | OUTPATIENT
Start: 2017-09-07 | End: 2017-09-07 | Stop reason: HOSPADM

## 2017-09-07 RX ORDER — BUPIVACAINE HYDROCHLORIDE AND EPINEPHRINE 2.5; 5 MG/ML; UG/ML
INJECTION, SOLUTION EPIDURAL; INFILTRATION; INTRACAUDAL; PERINEURAL AS NEEDED
Status: DISCONTINUED | OUTPATIENT
Start: 2017-09-07 | End: 2017-09-07 | Stop reason: HOSPADM

## 2017-09-07 RX ORDER — LIDOCAINE HYDROCHLORIDE 20 MG/ML
INJECTION, SOLUTION INFILTRATION; PERINEURAL AS NEEDED
Status: DISCONTINUED | OUTPATIENT
Start: 2017-09-07 | End: 2017-09-07 | Stop reason: SURG

## 2017-09-07 RX ORDER — PROPOFOL 10 MG/ML
VIAL (ML) INTRAVENOUS AS NEEDED
Status: DISCONTINUED | OUTPATIENT
Start: 2017-09-07 | End: 2017-09-07 | Stop reason: SURG

## 2017-09-07 RX ORDER — ATORVASTATIN CALCIUM 20 MG/1
20 TABLET, FILM COATED ORAL DAILY
Status: DISCONTINUED | OUTPATIENT
Start: 2017-09-07 | End: 2017-09-10 | Stop reason: HOSPADM

## 2017-09-07 RX ORDER — PROMETHAZINE HYDROCHLORIDE 25 MG/ML
25 INJECTION, SOLUTION INTRAMUSCULAR; INTRAVENOUS EVERY 6 HOURS PRN
Status: DISCONTINUED | OUTPATIENT
Start: 2017-09-07 | End: 2017-09-10 | Stop reason: HOSPADM

## 2017-09-07 RX ORDER — GLYCOPYRROLATE 0.2 MG/ML
INJECTION INTRAMUSCULAR; INTRAVENOUS AS NEEDED
Status: DISCONTINUED | OUTPATIENT
Start: 2017-09-07 | End: 2017-09-07 | Stop reason: SURG

## 2017-09-07 RX ORDER — DEXTROSE, SODIUM CHLORIDE, AND POTASSIUM CHLORIDE 5; .45; .15 G/100ML; G/100ML; G/100ML
100 INJECTION INTRAVENOUS CONTINUOUS
Status: DISCONTINUED | OUTPATIENT
Start: 2017-09-07 | End: 2017-09-08

## 2017-09-07 RX ORDER — MAGNESIUM HYDROXIDE 1200 MG/15ML
LIQUID ORAL AS NEEDED
Status: DISCONTINUED | OUTPATIENT
Start: 2017-09-07 | End: 2017-09-07 | Stop reason: HOSPADM

## 2017-09-07 RX ORDER — ONDANSETRON 2 MG/ML
4 INJECTION INTRAMUSCULAR; INTRAVENOUS EVERY 6 HOURS PRN
Status: DISCONTINUED | OUTPATIENT
Start: 2017-09-07 | End: 2017-09-10 | Stop reason: HOSPADM

## 2017-09-07 RX ORDER — BUPIVACAINE HYDROCHLORIDE AND EPINEPHRINE 2.5; 5 MG/ML; UG/ML
INJECTION, SOLUTION INFILTRATION; PERINEURAL AS NEEDED
Status: DISCONTINUED | OUTPATIENT
Start: 2017-09-07 | End: 2017-09-07 | Stop reason: HOSPADM

## 2017-09-07 RX ORDER — PROMETHAZINE HYDROCHLORIDE 12.5 MG/1
12.5 TABLET ORAL EVERY 6 HOURS PRN
Status: CANCELLED | OUTPATIENT
Start: 2017-09-07

## 2017-09-07 RX ORDER — HYDROCODONE BITARTRATE AND ACETAMINOPHEN 5; 325 MG/1; MG/1
2 TABLET ORAL EVERY 4 HOURS PRN
Status: DISCONTINUED | OUTPATIENT
Start: 2017-09-07 | End: 2017-09-10 | Stop reason: HOSPADM

## 2017-09-07 RX ORDER — PROMETHAZINE HYDROCHLORIDE 12.5 MG/1
12.5 SUPPOSITORY RECTAL EVERY 6 HOURS PRN
Status: CANCELLED | OUTPATIENT
Start: 2017-09-07

## 2017-09-07 RX ORDER — SENNA AND DOCUSATE SODIUM 50; 8.6 MG/1; MG/1
1 TABLET, FILM COATED ORAL NIGHTLY PRN
Status: DISCONTINUED | OUTPATIENT
Start: 2017-09-07 | End: 2017-09-10 | Stop reason: HOSPADM

## 2017-09-07 RX ORDER — CEFAZOLIN SODIUM 2 G/100ML
2 INJECTION, SOLUTION INTRAVENOUS EVERY 8 HOURS
Status: COMPLETED | OUTPATIENT
Start: 2017-09-07 | End: 2017-09-08

## 2017-09-07 RX ORDER — FENTANYL CITRATE 50 UG/ML
50 INJECTION, SOLUTION INTRAMUSCULAR; INTRAVENOUS
Status: DISCONTINUED | OUTPATIENT
Start: 2017-09-07 | End: 2017-09-07 | Stop reason: HOSPADM

## 2017-09-07 RX ORDER — DONEPEZIL HYDROCHLORIDE 10 MG/1
10 TABLET, FILM COATED ORAL NIGHTLY
Status: DISCONTINUED | OUTPATIENT
Start: 2017-09-07 | End: 2017-09-10 | Stop reason: HOSPADM

## 2017-09-07 RX ORDER — HYDROMORPHONE HCL IN 0.9% NACL 10 MG/50ML
PATIENT CONTROLLED ANALGESIA SYRINGE INTRAVENOUS CONTINUOUS
Status: DISCONTINUED | OUTPATIENT
Start: 2017-09-07 | End: 2017-09-08

## 2017-09-07 RX ORDER — HYDRALAZINE HYDROCHLORIDE 20 MG/ML
5 INJECTION INTRAMUSCULAR; INTRAVENOUS
Status: DISCONTINUED | OUTPATIENT
Start: 2017-09-07 | End: 2017-09-07 | Stop reason: HOSPADM

## 2017-09-07 RX ORDER — DIAZEPAM 5 MG/ML
5 INJECTION, SOLUTION INTRAMUSCULAR; INTRAVENOUS EVERY 4 HOURS PRN
Status: ACTIVE | OUTPATIENT
Start: 2017-09-07 | End: 2017-09-09

## 2017-09-07 RX ADMIN — CEFAZOLIN SODIUM 2 G: 2 INJECTION, SOLUTION INTRAVENOUS at 15:28

## 2017-09-07 RX ADMIN — SODIUM CHLORIDE: 9 INJECTION, SOLUTION INTRAVENOUS at 11:23

## 2017-09-07 RX ADMIN — CEFAZOLIN SODIUM 2 G: 2 INJECTION, SOLUTION INTRAVENOUS at 23:18

## 2017-09-07 RX ADMIN — ATORVASTATIN CALCIUM 20 MG: 20 TABLET, FILM COATED ORAL at 15:27

## 2017-09-07 RX ADMIN — PHENYLEPHRINE HYDROCHLORIDE 100 MCG: 10 INJECTION INTRAVENOUS at 08:48

## 2017-09-07 RX ADMIN — EPHEDRINE SULFATE 10 MG: 50 INJECTION INTRAMUSCULAR; INTRAVENOUS; SUBCUTANEOUS at 09:28

## 2017-09-07 RX ADMIN — HYDROMORPHONE HYDROCHLORIDE 0.5 MG: 2 INJECTION, SOLUTION INTRAMUSCULAR; INTRAVENOUS; SUBCUTANEOUS at 11:43

## 2017-09-07 RX ADMIN — PHENYLEPHRINE HYDROCHLORIDE 200 MCG: 10 INJECTION INTRAVENOUS at 09:19

## 2017-09-07 RX ADMIN — PHENYLEPHRINE HYDROCHLORIDE 200 MCG: 10 INJECTION INTRAVENOUS at 10:46

## 2017-09-07 RX ADMIN — METHOCARBAMOL 1000 MG: 100 INJECTION INTRAMUSCULAR; INTRAVENOUS at 12:58

## 2017-09-07 RX ADMIN — LISINOPRIL 10 MG: 10 TABLET ORAL at 15:28

## 2017-09-07 RX ADMIN — FENTANYL CITRATE 50 MCG: 50 INJECTION INTRAMUSCULAR; INTRAVENOUS at 12:18

## 2017-09-07 RX ADMIN — FAMOTIDINE 20 MG: 10 INJECTION, SOLUTION INTRAVENOUS at 07:33

## 2017-09-07 RX ADMIN — SODIUM CHLORIDE, POTASSIUM CHLORIDE, SODIUM LACTATE AND CALCIUM CHLORIDE 9 ML/HR: 600; 310; 30; 20 INJECTION, SOLUTION INTRAVENOUS at 07:32

## 2017-09-07 RX ADMIN — PHENYLEPHRINE HYDROCHLORIDE 200 MCG: 10 INJECTION INTRAVENOUS at 09:08

## 2017-09-07 RX ADMIN — GLYCOPYRROLATE 0.4 MG: 0.2 INJECTION INTRAMUSCULAR; INTRAVENOUS at 11:32

## 2017-09-07 RX ADMIN — FENTANYL CITRATE 50 MCG: 50 INJECTION INTRAMUSCULAR; INTRAVENOUS at 08:44

## 2017-09-07 RX ADMIN — POTASSIUM CHLORIDE, DEXTROSE MONOHYDRATE AND SODIUM CHLORIDE 100 ML/HR: 150; 5; 450 INJECTION, SOLUTION INTRAVENOUS at 16:30

## 2017-09-07 RX ADMIN — FENTANYL CITRATE 50 MCG: 50 INJECTION INTRAMUSCULAR; INTRAVENOUS at 11:31

## 2017-09-07 RX ADMIN — HYDROMORPHONE HYDROCHLORIDE 0.5 MG: 2 INJECTION, SOLUTION INTRAMUSCULAR; INTRAVENOUS; SUBCUTANEOUS at 11:36

## 2017-09-07 RX ADMIN — LIDOCAINE HYDROCHLORIDE 100 MG: 20 INJECTION, SOLUTION INFILTRATION; PERINEURAL at 08:07

## 2017-09-07 RX ADMIN — ONDANSETRON 4 MG: 2 INJECTION INTRAMUSCULAR; INTRAVENOUS at 16:27

## 2017-09-07 RX ADMIN — HYDROMORPHONE HYDROCHLORIDE 0.5 MG: 2 INJECTION, SOLUTION INTRAMUSCULAR; INTRAVENOUS; SUBCUTANEOUS at 11:37

## 2017-09-07 RX ADMIN — SODIUM CHLORIDE, POTASSIUM CHLORIDE, SODIUM LACTATE AND CALCIUM CHLORIDE: 600; 310; 30; 20 INJECTION, SOLUTION INTRAVENOUS at 09:10

## 2017-09-07 RX ADMIN — PROPOFOL 150 MG: 10 INJECTION, EMULSION INTRAVENOUS at 08:07

## 2017-09-07 RX ADMIN — CEFAZOLIN SODIUM 2 G: 2 INJECTION, SOLUTION INTRAVENOUS at 08:14

## 2017-09-07 RX ADMIN — NEOSTIGMINE METHYLSULFATE 2 MG: 1 INJECTION INTRAMUSCULAR; INTRAVENOUS; SUBCUTANEOUS at 11:32

## 2017-09-07 RX ADMIN — Medication: at 12:54

## 2017-09-07 RX ADMIN — PHENYLEPHRINE HYDROCHLORIDE 100 MCG: 10 INJECTION INTRAVENOUS at 10:14

## 2017-09-07 RX ADMIN — PHENYLEPHRINE HYDROCHLORIDE 100 MCG: 10 INJECTION INTRAVENOUS at 09:38

## 2017-09-07 RX ADMIN — ROCURONIUM BROMIDE 10 MG: 10 INJECTION INTRAVENOUS at 10:31

## 2017-09-07 RX ADMIN — ONDANSETRON 4 MG: 2 INJECTION INTRAMUSCULAR; INTRAVENOUS at 11:29

## 2017-09-07 RX ADMIN — PHENYLEPHRINE HYDROCHLORIDE 100 MCG: 10 INJECTION INTRAVENOUS at 11:13

## 2017-09-07 RX ADMIN — EPHEDRINE SULFATE 10 MG: 50 INJECTION INTRAMUSCULAR; INTRAVENOUS; SUBCUTANEOUS at 10:08

## 2017-09-07 RX ADMIN — ROCURONIUM BROMIDE 40 MG: 10 INJECTION INTRAVENOUS at 08:07

## 2017-09-07 RX ADMIN — EPHEDRINE SULFATE 10 MG: 50 INJECTION INTRAMUSCULAR; INTRAVENOUS; SUBCUTANEOUS at 08:36

## 2017-09-07 RX ADMIN — FENTANYL CITRATE 100 MCG: 50 INJECTION INTRAMUSCULAR; INTRAVENOUS at 08:07

## 2017-09-07 NOTE — PLAN OF CARE
Problem: Patient Care Overview (Adult)  Goal: Plan of Care Review  Outcome: Ongoing (interventions implemented as appropriate)    09/07/17 1313   Coping/Psychosocial Response Interventions   Plan Of Care Reviewed With patient   Patient Care Overview   Progress improving   Outcome Evaluation   Outcome Summary/Follow up Plan pt will have tolerable pain during this hospital visit

## 2017-09-07 NOTE — ANESTHESIA PROCEDURE NOTES
Airway  Urgency: elective    Date/Time: 9/7/2017 8:10 AM  Airway not difficult    General Information and Staff    Patient location during procedure: OR  Anesthesiologist: AJAY PINEDA  CRNA: MICHELL LITTLEJOHN    Indications and Patient Condition  Indications for airway management: airway protection    Preoxygenated: yes  MILS maintained throughout  Mask difficulty assessment: 1 - vent by mask    Final Airway Details  Final airway type: endotracheal airway      Successful airway: ETT  Cuffed: yes   Successful intubation technique: direct laryngoscopy  Endotracheal tube insertion site: oral  Blade: George  Blade size: #2  ETT size: 8.0 mm  Cormack-Lehane Classification: grade IIa - partial view of glottis  Placement verified by: chest auscultation and capnometry   Measured from: lips  ETT to lips (cm): 22  Number of attempts at approach: 1

## 2017-09-07 NOTE — ANESTHESIA POSTPROCEDURE EVALUATION
Patient: Julio Busby    Procedure Summary     Date Anesthesia Start Anesthesia Stop Room / Location    09/07/17 0801 1159 [facility] / [facility]       Procedure Diagnosis Surgeon Provider    LUMBAR FUSION DECOMPRESSON WITH PEDICLE SCREWS, Miniopen L4/5 decompression/PLIF/cages with Sextant pedicle screws L4/5 (Bilateral Spine Lumbar) Spinal stenosis, lumbar region, with neurogenic claudication; Spondylolisthesis at L4-L5 level  (Spinal stenosis, lumbar region, with neurogenic claudication [M48.06]; Spondylolisthesis at L4-L5 level [M43.16]) MD Jihan Edouard MD          Anesthesia Type: general  Last vitals  BP   114/75 (09/07/17 1245)    Temp        Pulse   99 (09/07/17 1245)   Resp   12 (09/07/17 1245)    SpO2   96 % (09/07/17 1245)      Post Anesthesia Care and Evaluation    Patient location during evaluation: bedside  Patient participation: complete - patient participated  Level of consciousness: awake and alert  Pain management: adequate  Airway patency: patent  Anesthetic complications: No anesthetic complications    Cardiovascular status: acceptable  Respiratory status: acceptable  Hydration status: acceptable    Comments: /75  Pulse 99  Temp 37 °C (98.6 °F) (Oral)   Resp 12  SpO2 96%

## 2017-09-07 NOTE — BRIEF OP NOTE
LUMBAR FUSION SEXTANT AND METRIX WITH STEALTH  Procedure Note    Julio Busby  9/7/2017    Pre-op Diagnosis:   Spinal stenosis, lumbar region, with neurogenic claudication [M48.06]  Spondylolisthesis at L4-L5 level [M43.16]    Post-op Diagnosis:     Post-Op Diagnosis Codes:     * Spinal stenosis, lumbar region, with neurogenic claudication [M48.06]     * Spondylolisthesis at L4-L5 level [M43.16]    Procedure/CPT® Codes:      Procedure(s):  LUMBAR FUSION DECOMPRESSON WITH PEDICLE SCREWS, Miniopen L4/5 decompression/PLIF/cages with Sextant pedicle screws L4/5    Surgeon(s):  Gerson Jennings MD    Anesthesia: General    Staff:   Circulator: Mary Stratton RN; Maria A Glass RN  Radiology Technologist: Rui Osullivan; Marlyn Winn, KATHY; Eugenie Robb  Scrub Person: Harrison Sargent; Reid Harris  Vendor Representative: Sriram Sheriff  Assistant: Vandana Harp CSA  Orientee: Ambika Kerr RN    Estimated Blood Loss: 270 mL  Urine Voided: 1000 mL    Specimens:                * No specimens in log *      Drains:   Urethral Catheter 09/07/17 0815 100% silicone 16 10 (Active)           Findings: severe stenosis    Complications: none      Gerson Jennings MD     Date: 9/7/2017  Time: 12:01 PM

## 2017-09-07 NOTE — H&P (VIEW-ONLY)
Subjective   Patient ID: Julio Busby is a 71 y.o. male is here today for follow-up after brain MRI ordered by Dr. Becerril.  He wife states she has noticed a change in his short-memory loss over the past year.  He denies any headaches, dizziness or vision changes.       Memory Loss   The current episode started more than 1 year ago. The problem occurs intermittently. Pertinent negatives include no headaches.       The following portions of the patient's history were reviewed and updated as appropriate: allergies, current medications, past family history, past medical history, past social history, past surgical history and problem list.    Review of Systems   Neurological: Negative for dizziness and headaches.   Psychiatric/Behavioral: Positive for confusion. The patient is nervous/anxious.    All other systems reviewed and are negative.    I saw this patient last week. He is here again with his wife. I spoke with Bolivar Becerril, DO his neurologist who had been seeing him for some memory loss. He does have a history of previous strokes. His brain MRI which was ordered by Dr. Becerril was discussed. He does have a lot of atrophy and evidence of old strokes. There are some radiographic findings that are suggestive of amyloid angiopathy. I have reviewed them and agree with the report. I did acknowledge to the patient he is probably at somewhat increased risk for stroke and bleeding as a result of the general anesthetic, but I do not think that prohibits doing the surgery. He has been cleared from a cardiac perspective. If we do not do this then his quality of life remains poor because of his inability to walk. So, I explained to them that we will go ahead and move forward with the surgery and keep a special watch on things like blood pressure after the surgery.          Objective   Physical Exam   Constitutional: He is oriented to person, place, and time. He appears well-developed and well-nourished.   HENT:   Head: Normocephalic  and atraumatic.   Eyes: Conjunctivae and EOM are normal. Pupils are equal, round, and reactive to light.   Fundoscopic exam:       The right eye shows no papilledema. The right eye shows venous pulsations.        The left eye shows no papilledema. The left eye shows venous pulsations.   Neck: Carotid bruit is not present.   Neurological: He is oriented to person, place, and time. He has a normal Finger-Nose-Finger Test and a normal Heel to Shin Test. Gait normal.   Reflex Scores:       Tricep reflexes are 2+ on the right side and 2+ on the left side.       Bicep reflexes are 2+ on the right side and 2+ on the left side.       Brachioradialis reflexes are 2+ on the right side and 2+ on the left side.       Patellar reflexes are 2+ on the right side and 2+ on the left side.       Achilles reflexes are 2+ on the right side and 2+ on the left side.  Psychiatric: His speech is normal.     Neurologic Exam     Mental Status   Oriented to person, place, and time.   Registration of memory: Good recent and remote memory.   Attention: normal. Concentration: normal.   Speech: speech is normal   Level of consciousness: alert  Knowledge: consistent with education.     Cranial Nerves     CN II   Visual fields full to confrontation.   Visual acuity: normal    CN III, IV, VI   Pupils are equal, round, and reactive to light.  Extraocular motions are normal.     CN V   Facial sensation intact.   Right corneal reflex: normal  Left corneal reflex: normal    CN VII   Facial expression full, symmetric.   Right facial weakness: none  Left facial weakness: none    CN VIII   Hearing: intact    CN IX, X   Palate: symmetric    CN XI   Right sternocleidomastoid strength: normal  Left sternocleidomastoid strength: normal    CN XII   Tongue: not atrophic  Tongue deviation: none    Motor Exam   Muscle bulk: normal  Right arm tone: normal  Left arm tone: normal  Right leg tone: normal  Left leg tone: normal    Strength   Strength 5/5 except as  noted.     Sensory Exam   Light touch normal.     Gait, Coordination, and Reflexes     Gait  Gait: normal    Coordination   Finger to nose coordination: normal  Heel to shin coordination: normal    Reflexes   Right brachioradialis: 2+  Left brachioradialis: 2+  Right biceps: 2+  Left biceps: 2+  Right triceps: 2+  Left triceps: 2+  Right patellar: 2+  Left patellar: 2+  Right achilles: 2+  Left achilles: 2+  Right : 2+  Left : 2+      Assessment/Plan   Independent Review of Radiographic Studies:    I have reviewed the brain MRI done 08/15/2017 which shows evidence of old strokes. There is diffuse volume loss in the cerebrum, cerebellum, and brainstem. There is extensive chronic small vessel disease. There is some evidence of micro hemorrhages, possibly related to amyloid angiopathy. There is also some degeneration at C1-C2 and C2-C3. I agree with the report.       Medical Decision Making:    I do not think any of these MRI findings prohibit doing the surgery plan on next week. I did acknowledge to him; however, that there is probably an increased risk of stroke in him including hemorrhagic stroke. I do not think it is an unacceptable risk. So, we will move forward with the L4-L5 decompression and fusion as discussed in my last visit. This will be done next week.       Julio was seen today for memory loss.    Diagnoses and all orders for this visit:    Cerebral amyloid angiopathy    Hx of cerebral infarction    No Follow-up on file.

## 2017-09-07 NOTE — PLAN OF CARE
Problem: Perioperative Period (Adult)  Goal: Signs and Symptoms of Listed Potential Problems Will be Absent or Manageable (Perioperative Period)  Outcome: Ongoing (interventions implemented as appropriate)    09/07/17 1312   Perioperative Period   Problems Assessed (Perioperative Period) all   Problems Present (Perioperative Period) none

## 2017-09-07 NOTE — OP NOTE
Preoperative diagnosis: Lumbar spinal stenosis L4/5 with degenerative grade 1 spondylolisthesis L4/5 with bilateral neurogenic claudication    Postoperative diagnosis: Same as above    Procedures performed: Miniopern L4/5 decompression, posterior lumbar interbody fusion (PLIF) at L4/5, bilateral cages at L4/5, Sextant pedicle screw fixation L4/5, harvest of laminar autograft, preparation of morscellized allograft    Surgeon: Dick    First Assistant: Vandana Harp  (She greatly assisted in the exposure, control of bleeding, retraction, and closure of the incision.)    Anesthesia: GET    EBL: 270 cc    Complications:  None    Specimen sent: None    Drains: None    Findings: severe stenosis    Postoperative condition: good    Indications for the operation:The patient is a 71-year-old male who has a several year history of bilateral buttock and leg pain and some back pain consistent with neurogenic claudication.  The pain was intractable due to severe spinal stenosis at L4-L5 with a superimposed spondylolisthesis.  He failed conservative treatments including blocks, physical therapy, medicines, and time itself, and was brought to the operating room today for a decompression and fusion.          Informed consent: He understood that the  goal of surgery is relief of radiating leg pain, improvement of numbness, tingling, and weakness, and reduction in overall low back pain. The risks include, but are not limited to, infection, hemorrhage requiring transfusion or reoperation, CSF leak requiring reoperation, incomplete relief of symptoms, psuedoarthrosis resulting in chronic low back pain, hardward problems requiring revision or removal, potential need for additional surgery in the future, stroke, paralysis, coma, and death. The patient agrees to proceed.         Details of the operation:After medical clearance, the patient was taken to the operating room and remained in his gurney in a supine position. After  induction and endotracheal intubation, he got 2 g of Kefzol as per the SCIP protocol. A Holland catheter was placed. SCDs were placed. Venous access was secured. He was rolled over in the prone position on a radiolucent Macario spinal table. All pressure points were padded including the brachial plexus. We brought in the lateral C-arm and marked out the incision at the L4-L5 level in the midline.  The lumbar region was then prepped and draped in the usual sterile fashion. We did a surgical timeout. I injected 20 mL of 0.25% marcaine with epinephrine into the paraspinous musculature on the right and the left at L4-L5. A linear incision was made at the midline at L4-L5 with a #10 skin knife. Hemostasis was obtained with monopolar cautery. Dissection was taken all the way down to the lumbar fascia which divided to the left and to the right at L4 and L5. Rand periosteum was used to strip the paraspinous musculature out laterally. Self-retraining retractors were placed. We got an x-ray which showed that the towel clip was at the L4-L5 level. We got another picture ultimately showing the needle in the L4-L5 disk space.     The spinous processes were removed at L4 and L5 with a Leksell rongeur. Using a 6 mm round bur on the pneumatic [place] air drill, I did a generous central laminectomy at L4 and L5 all the way down to the ligamentum flavum. The microscope was draped and brought into the field.  Its use was essential at the performance of microneurosurgical technique. We switched over to a 3 mm Matchstick and under magnification I completed the laminectomy and eventually we did a complete facetectomy at L4-L5 bilaterally and a foraminotomy, decompressing both the L4 and L5 roots on both sides. I was able to then incise the disk space at L4-L5 and then using reverse angled curettes, pituitaries, end-plate scrapers, and ring curettes, I did essentially a near-complete L4-L5 diskectomy. I sized out for a 26 x 10 mm CAPSTONE  cage. I did, along the way, harvest laminar autograft during the decompression part. I used demineralized bone material and patched it into the interspace along with the autograft harvested from the lamina at L4-L5 and then I placed bilateral 26 x 10 mm CAPSTONE cages bilaterally at L4-L5 interspace with C-arm guidance. It was recessed slightly. Hemostasis was obtained. Antibiotic irrigation was used.    FloSeal was used. No drain was necessary. The central incision was closed in layers with 0 Vicryl interrupted suture and 2-0 Vicryl interrupted suture and 4-0 Vicryl subcuticular. I then brought in the PA C-arm and now we had both PA and lateral C-arms. I identified the pedicle entry sites bilateral at L4 and L5, and I injected each side with 5 mL of 0.25% marcaine. I made paramedian stab incisions on the right at L4-L5 and on the left at L4-L5, and then using biplanar C-arm fluoroscopy, using a modified Jamshidi needle I cannulated first the right then the left L4 pedicle using the transpedicular technique and biplanar C-arm fluoroscopy.     I did the same thing at L5, putting guidewires through each of the pedicle sites, a total of 4. I then dilated and tapped and put bilateral 6.5 x 55 mm pedicle screws over the wire at L4 and then I dilated and tapped and put 6.5 bilaterally x 55 mm screws at L5. The placement was excellent based on C-arm criteria.  I then put the Sextant device and passed bilaterally using the superior approach, 40 mm rods through the screw heads on the right and the left and tightened them in compression. X-rays done with the C-arm in the PA and lateral views showed excellent placement of the screws and the rods.  The stab incisions were irrigated and closed with 3-0 vicryl and 4-0 vicryl subcuticuler. Sterile clean dressings were placed. He was rolled over in the supine position, extubated and taken to the PACU in good condition.

## 2017-09-07 NOTE — PLAN OF CARE
Problem: Pain, Acute (Adult)  Goal: Acceptable Pain Control/Comfort Level  Outcome: Ongoing (interventions implemented as appropriate)    09/07/17 1721   Pain, Acute (Adult)   Acceptable Pain Control/Comfort Level making progress toward outcome   Pain controlled with PCA Dilaudid, Some nausea  Treated with Zofran with limited success Dressing dry

## 2017-09-07 NOTE — PLAN OF CARE
Problem: Patient Care Overview (Adult)  Goal: Plan of Care Review  Outcome: Ongoing (interventions implemented as appropriate)    09/07/17 0708   Coping/Psychosocial Response Interventions   Plan Of Care Reviewed With patient   Patient Care Overview   Progress no change       Goal: Adult Individualization and Mutuality  Outcome: Ongoing (interventions implemented as appropriate)    09/07/17 0708   Individualization   Patient Specific Preferences PT GOES BY JEET.       Goal: Discharge Needs Assessment  Outcome: Ongoing (interventions implemented as appropriate)    Problem: Perioperative Period (Adult)  Goal: Signs and Symptoms of Listed Potential Problems Will be Absent or Manageable (Perioperative Period)  Outcome: Ongoing (interventions implemented as appropriate)    09/07/17 0708   Perioperative Period   Problems Assessed (Perioperative Period) pain;infection   Problems Present (Perioperative Period) pain

## 2017-09-07 NOTE — ANESTHESIA PREPROCEDURE EVALUATION
Anesthesia Evaluation     Patient summary reviewed and Nursing notes reviewed   NPO Solid Status: > 8 hours  NPO Liquid Status: > 2 hours     Airway   Mallampati: II  no difficulty expected  Dental - normal exam   (+) edentulous    Pulmonary - normal exam   Cardiovascular - normal exam    ECG reviewed  PT is on anticoagulation therapy    (+) hypertension, valvular problems/murmurs, hyperlipidemia      Neuro/Psych  (+) CVA, numbness, dementia,    GI/Hepatic/Renal/Endo      Musculoskeletal     Abdominal    Substance History      OB/GYN          Other   (+) arthritis                                     Anesthesia Plan    ASA 3     general     Anesthetic plan and risks discussed with spouse/significant other and patient.

## 2017-09-08 LAB
ANION GAP SERPL CALCULATED.3IONS-SCNC: 9.2 MMOL/L
BASOPHILS # BLD AUTO: 0 10*3/MM3 (ref 0–0.2)
BASOPHILS NFR BLD AUTO: 0 % (ref 0–1.5)
BUN BLD-MCNC: 14 MG/DL (ref 8–23)
BUN/CREAT SERPL: 16.1 (ref 7–25)
CALCIUM SPEC-SCNC: 8.3 MG/DL (ref 8.6–10.5)
CHLORIDE SERPL-SCNC: 101 MMOL/L (ref 98–107)
CO2 SERPL-SCNC: 28.8 MMOL/L (ref 22–29)
CREAT BLD-MCNC: 0.87 MG/DL (ref 0.76–1.27)
DEPRECATED RDW RBC AUTO: 44.2 FL (ref 37–54)
EOSINOPHIL # BLD AUTO: 0.01 10*3/MM3 (ref 0–0.7)
EOSINOPHIL NFR BLD AUTO: 0.1 % (ref 0.3–6.2)
ERYTHROCYTE [DISTWIDTH] IN BLOOD BY AUTOMATED COUNT: 13.2 % (ref 11.5–14.5)
GFR SERPL CREATININE-BSD FRML MDRD: 86 ML/MIN/1.73
GLUCOSE BLD-MCNC: 89 MG/DL (ref 65–99)
HCT VFR BLD AUTO: 32.2 % (ref 40.4–52.2)
HGB BLD-MCNC: 10.5 G/DL (ref 13.7–17.6)
IMM GRANULOCYTES # BLD: 0.04 10*3/MM3 (ref 0–0.03)
IMM GRANULOCYTES NFR BLD: 0.5 % (ref 0–0.5)
LYMPHOCYTES # BLD AUTO: 1.41 10*3/MM3 (ref 0.9–4.8)
LYMPHOCYTES NFR BLD AUTO: 16.1 % (ref 19.6–45.3)
MCH RBC QN AUTO: 29.6 PG (ref 27–32.7)
MCHC RBC AUTO-ENTMCNC: 32.6 G/DL (ref 32.6–36.4)
MCV RBC AUTO: 90.7 FL (ref 79.8–96.2)
MONOCYTES # BLD AUTO: 1.03 10*3/MM3 (ref 0.2–1.2)
MONOCYTES NFR BLD AUTO: 11.8 % (ref 5–12)
NEUTROPHILS # BLD AUTO: 6.27 10*3/MM3 (ref 1.9–8.1)
NEUTROPHILS NFR BLD AUTO: 71.5 % (ref 42.7–76)
PLATELET # BLD AUTO: 142 10*3/MM3 (ref 140–500)
PMV BLD AUTO: 10.8 FL (ref 6–12)
POTASSIUM BLD-SCNC: 4.1 MMOL/L (ref 3.5–5.2)
RBC # BLD AUTO: 3.55 10*6/MM3 (ref 4.6–6)
SODIUM BLD-SCNC: 139 MMOL/L (ref 136–145)
WBC NRBC COR # BLD: 8.76 10*3/MM3 (ref 4.5–10.7)

## 2017-09-08 PROCEDURE — 80048 BASIC METABOLIC PNL TOTAL CA: CPT | Performed by: NEUROLOGICAL SURGERY

## 2017-09-08 PROCEDURE — 25010000003 CEFAZOLIN IN DEXTROSE 2-4 GM/100ML-% SOLUTION: Performed by: NEUROLOGICAL SURGERY

## 2017-09-08 PROCEDURE — 99024 POSTOP FOLLOW-UP VISIT: CPT | Performed by: NURSE PRACTITIONER

## 2017-09-08 PROCEDURE — 97161 PT EVAL LOW COMPLEX 20 MIN: CPT

## 2017-09-08 PROCEDURE — 97110 THERAPEUTIC EXERCISES: CPT

## 2017-09-08 PROCEDURE — 85025 COMPLETE CBC W/AUTO DIFF WBC: CPT | Performed by: NEUROLOGICAL SURGERY

## 2017-09-08 RX ORDER — DOCUSATE SODIUM 100 MG/1
200 CAPSULE, LIQUID FILLED ORAL 2 TIMES DAILY
Status: DISCONTINUED | OUTPATIENT
Start: 2017-09-08 | End: 2017-09-10 | Stop reason: HOSPADM

## 2017-09-08 RX ADMIN — CEFAZOLIN SODIUM 2 G: 2 INJECTION, SOLUTION INTRAVENOUS at 08:53

## 2017-09-08 RX ADMIN — ATORVASTATIN CALCIUM 20 MG: 20 TABLET, FILM COATED ORAL at 08:56

## 2017-09-08 RX ADMIN — HYDROCODONE BITARTRATE AND ACETAMINOPHEN 2 TABLET: 5; 325 TABLET ORAL at 05:47

## 2017-09-08 RX ADMIN — DONEPEZIL HYDROCHLORIDE 10 MG: 10 TABLET, FILM COATED ORAL at 20:52

## 2017-09-08 RX ADMIN — HYDROCODONE BITARTRATE AND ACETAMINOPHEN 2 TABLET: 5; 325 TABLET ORAL at 23:41

## 2017-09-08 RX ADMIN — DOCUSATE SODIUM 200 MG: 100 CAPSULE, LIQUID FILLED ORAL at 17:12

## 2017-09-08 NOTE — PLAN OF CARE
Problem: Patient Care Overview (Adult)  Goal: Plan of Care Review    09/08/17 1033   Coping/Psychosocial Response Interventions   Plan Of Care Reviewed With patient;family   Outcome Evaluation   Outcome Summary/Follow up Plan Pt is a pleasant 71 y.o. male s/p lumbar fusion decompression L4/5. Skilled inpt PT is deemed necessary at this time to address functional deficits and improve I will all functional mobility. PT recommends d/c home with assist and HHPT when appropriate.         Problem: Inpatient Physical Therapy  Goal: Bed Mobility Goal LTG- PT    09/08/17 1033   Bed Mobility PT LTG   Bed Mobility PT LTG, Date Established 09/08/17   Bed Mobility PT LTG, Time to Achieve 2 - 3 days   Bed Mobility PT LTG, Activity Type all bed mobility   Bed Mobility PT LTG, Early Level independent       Goal: Transfer Training Goal 1 LTG- PT    09/08/17 1033   Transfer Training PT LTG   Transfer Training PT LTG, Date Established 09/08/17   Transfer Training PT LTG, Time to Achieve 2 - 3 days   Transfer Training PT LTG, Activity Type all transfers   Transfer Training PT LTG, Early Level independent   Transfer Training PT LTG, Assist Device walker, rolling       Goal: Gait Training Goal LTG- PT    09/08/17 1033   Gait Training PT LTG   Gait Training Goal PT LTG, Date Established 09/08/17   Gait Training Goal PT LTG, Time to Achieve 2 - 3 days   Gait Training Goal PT LTG, Early Level independent   Gait Training Goal PT LTG, Assist Device walker, rolling       Goal: Stair Training Goal LTG- PT    09/08/17 1033   Stair Training PT LTG   Stair Training Goal PT LTG, Date Established 09/08/17   Stair Training Goal PT LTG, Time to Achieve 2 - 3 days   Stair Training Goal PT LTG, Number of Steps 2   Stair Training Goal PT LTG, Early Level supervision required   Stair Training Goal PT LTG, Assist Device 1 handrail

## 2017-09-08 NOTE — THERAPY EVALUATION
"Acute Care - Physical Therapy Initial Evaluation  [facility]     Patient Name: Julio Busby  : 1945  MRN: [id number]  Today's Date: 2017   Onset of Illness/Injury or Date of Surgery Date: 17  Date of Referral to PT: 17  Referring Physician: Gerson Oakley ()      Admit Date: 2017     Visit Dx:    ICD-10-CM ICD-9-CM   1. Difficulty walking R26.2 719.7     Patient Active Problem List   Diagnosis   • CVA (cerebrovascular accident)   • Diverticulosis of colon without hemorrhage   • Hyperlipidemia   • Essential hypertension   • Anemia due to blood loss, chronic   • DDD (degenerative disc disease), lumbar   • Neuropathy of right sciatic nerve   • B12 deficiency   • Spinal stenosis, lumbar region, with neurogenic claudication   • Spondylolisthesis at L4-L5 level   • Hx of cerebral infarction   • Cerebral amyloid angiopathy   • Stenosis, spinal, lumbar     Past Medical History:   Diagnosis Date   • Anemia due to blood loss, chronic     resolved???   • Arthralgia    • CVA (cerebrovascular accident)     \"20 years ago\"   • Diverticulosis of colon without hemorrhage    • Essential hypertension    • Gait difficulty    • H/O complete eye exam 2017   • Hyperlipidemia    • Low back pain    • Memory disturbance    • Mitral regurgitation     trace   • Pain in buttock     bilateral   • Right leg pain    • Right sided weakness    • Screening for prostate cancer     unsure   • Short-term memory loss    • Spinal stenosis, lumbar region, with neurogenic claudication    • Spondylolisthesis at L4-L5 level    • Tingling    • Tricuspid regurgitation     trace     Past Surgical History:   Procedure Laterality Date   • APPENDECTOMY     • COLONOSCOPY     • LUMBAR FUSION Bilateral 2017    Procedure: LUMBAR FUSION DECOMPRESSON WITH PEDICLE SCREWS, Miniopen L4/5 decompression/PLIF/cages with Sextant pedicle screws L4/5;  Surgeon: Gerson Jennings MD;  Location: [facility] MAIN OR;  Service:  "          PT ASSESSMENT (last 72 hours)      PT Evaluation       09/08/17 1017 09/07/17 2120    Rehab Evaluation    Document Type evaluation  -RD (r) WB (t) RD (c)     Subjective Information agree to therapy;complains of;pain  -RD (r) WB (t) RD (c)     Patient Effort, Rehab Treatment good  -RD (r) WB (t) RD (c)     Symptoms Noted Comment Pt reported pain to low back, B buttocks and posterior thighs. Pain decreased with gait training.  -RD (r) WB (t) RD (c)     General Information    Onset of Illness/Injury or Date of Surgery Date 09/07/17  -RD (r) WB (t) RD (c)     Referring Physician Gerson Oakley (09/08)  -RD (r) WB (t) RD (c)     General Observations Pt supine in bed with HOB elevated upon entry. Family with pt.   -RD (r) WB (t) RD (c)     Pertinent History Of Current Problem Pt. s/p lumbar fusion decompression.  -RD (r) WB (t) RD (c)     Precautions/Limitations fall precautions;spinal precautions;other (see comments)   s/p lumbar fusion decompression  -RD (r) WB (t) RD (c)     Prior Level of Function independent:  -RD (r) WB (t) RD (c)     Equipment Currently Used at Home --   Owns standard walker but does not use.  -RD (r) WB (t) RD (c) none  -EZ    Plans/Goals Discussed With patient and family  -RD (r) WB (t) RD (c)     Risks Reviewed patient and family:  -RD (r) WB (t) RD (c)     Benefits Reviewed patient and family:  -RD (r) WB (t) RD (c)     Barriers to Rehab none identified  -RD (r) WB (t) RD (c)     Living Environment    Home Accessibility stairs (1 railing present);other (see comments)   2 steps  -RD (r) WB (t) RD (c)     Stair Railings at Home other (see comments)   1 HR  -RD (r) WB (t) RD (c)     Clinical Impression    Date of Referral to PT 09/08/17  -RD (r) WB (t) RD (c)     Patient/Family Goals Statement --   Improve I with all functional mobility  -RD (r) WB (t) RD (c)     Criteria for Skilled Therapeutic Interventions Met treatment indicated  -RD (r) WB (t) RD (c)     Impairments Found  (describe specific impairments) gait, locomotion, and balance;muscle performance  -RD (r) WB (t) RD (c)     Rehab Potential good, to achieve stated therapy goals  -RD (r) WB (t) RD (c)     Pain Assessment    Pain Assessment 0-10  -RD (r) WB (t) RD (c)     Pain Score 4  -RD (r) WB (t) RD (c)     Post Pain Score 4  -RD (r) WB (t) RD (c)     Pain Type Acute pain;Surgical pain  -RD (r) WB (t) RD (c)     Pain Location Back   B buttocks and posterior thighs  -RD (r) WB (t) RD (c)     Pain Intervention(s) Repositioned;Rest;Ambulation/increased activity  -RD (r) WB (t) RD (c)     Cognitive Assessment/Intervention    Current Cognitive/Communication Assessment functional  -RD (r) WB (t) RD (c)     Orientation Status oriented x 4  -RD (r) WB (t) RD (c)     Follows Commands/Answers Questions 100% of the time  -RD (r) WB (t) RD (c)     Personal Safety WNL/WFL  -RD (r) WB (t) RD (c)     Personal Safety Interventions fall prevention program maintained;gait belt;nonskid shoes/slippers when out of bed;supervised activity  -RD (r) WB (t) RD (c)     ROM (Range of Motion)    General ROM Detail BUE/LE (WFLs)  -RD (r) WB (t) RD (c)     MMT (Manual Muscle Testing)    General MMT Assessment Detail BUE/LE (>/= 3/5)  -RD (r) WB (t) RD (c)     Bed Mobility, Assessment/Treatment    Bed Mob, Supine to Sit, [place] supervision required  -RD (r) WB (t) RD (c)     Bed Mobility, Comment SBA for safety. Pt education re: log roll for spinal precautions. VC for task breakdown.  -RD (r) WB (t) RD (c)     Transfer Assessment/Treatment    Transfers, Sit-Stand [place] contact guard assist  -RD (r) WB (t) RD (c)     Transfers, Stand-Sit [place] contact guard assist  -RD (r) WB (t) RD (c)     Transfers, Sit-Stand-Sit, Assist Device rolling walker  -RD (r) WB (t) RD (c)     Transfer, Comment VC for task breakdown  -RD (r) WB (t) RD (c)     Gait Assessment/Treatment    Gait, [place] Level contact guard assist  -RD (r) WB (t) RD (c)      Gait, Assistive Device rolling walker  -RD (r) WB (t) RD (c)     Gait, Distance (Feet) 210  -RD (r) WB (t) RD (c)     Gait, Gait Pattern Analysis swing-through gait  -RD (r) WB (t) RD (c)     Gait, Gait Deviations antalgic;[place] decreased;step length decreased;stride length decreased;other (see comments)   LLE ER  -RD (r) WB (t) RD (c)     Gait, Comment CGA for safety  -RD (r) WB (t) RD (c)     Positioning and Restraints    Pre-Treatment Position in bed  -RD (r) WB (t) RD (c)     Post Treatment Position chair  -RD (r) WB (t) RD (c)     In Chair notified nsg;sitting;call light within reach;encouraged to call for assist;exit alarm on;with family/caregiver;with nsg   Exit alarm on; foot plate for BLE support.  -RD (r) WB (t) RD (c)       09/07/17 0651       General Information    Equipment Currently Used at Home none  -TG     Living Environment    Lives With spouse  -TG     Living Arrangements house  -TG     Home Accessibility no concerns  -TG     Stair Railings at Home none  -TG     Type of Financial/Environmental Concern none  -TG     Transportation Available car  -TG       User Key  (r) = Recorded By, (t) = Taken By, (c) = Cosigned By    Initials Name Provider Type    RD Karon Vaughn, PT Physical Therapist    TG Atilio Pike, RN Registered Nurse    EZ Beba Leiva RN Registered Nurse    WB Janusz Chamberlain, PT Student PT Student          Physical Therapy Education     Title: PT OT SLP Therapies (Active)     Topic: Physical Therapy (Active)     Point: Mobility training (Done)    Learning Progress Summary    Learner Readiness Method Response Comment Documented by Status   Patient Acceptance SUMMER FOURNIER,NR  WB 09/08/17 1033 Done   Family Acceptance ESUMMER VU,NR  WB 09/08/17 1033 Done               Point: Body mechanics (Done)    Learning Progress Summary    Learner Readiness Method Response Comment Documented by Status   Patient Acceptance SUMMER FOURNIER,NR   09/08/17 1033 Done   Family Acceptance SUMMER FOURNIER,NR   09/08/17  1033 Done               Point: Precautions (Done)    Learning Progress Summary    Learner Readiness Method Response Comment Documented by Status   Patient Acceptance SUMMER FOURNIER,NR  WB 09/08/17 1033 Done   Family Acceptance SUMMER FOURNIER,NR  WB 09/08/17 1033 Done                      User Key     Initials Effective Dates Name Provider Type Discipline     08/21/17 -  Janusz Chamberlain, PT Student PT Student PT                PT Recommendation and Plan  Anticipated Equipment Needs At Discharge: other (see comments) (Rwx)  Anticipated Discharge Disposition: home with assist, home with home health  Planned Therapy Interventions: balance training, bed mobility training, gait training, home exercise program, neuromuscular re-education, patient/family education, postural re-education, strengthening, stair training, transfer training  PT Frequency: 2 times/day  Plan of Care Review  Plan Of Care Reviewed With: patient, family  Outcome Summary/Follow up Plan: Pt is a pleasant 71 y.o. male s/p lumbar fusion decompression L4/5. Skilled inpt PT is deemed necessary at this time to address functional deficits and improve I will all functional mobility. PT recommends d/c home with assist and HHPT when appropriate.          IP PT Goals       09/08/17 1033          Bed Mobility PT LTG    Bed Mobility PT LTG, Date Established 09/08/17  -RD (r) WB (t) RD (c)      Bed Mobility PT LTG, Time to Achieve 2 - 3 days  -RD (r) WB (t) RD (c)      Bed Mobility PT LTG, Activity Type all bed mobility  -RD (r) WB (t) RD (c)      Bed Mobility PT LTG, [place] Level independent  -RD (r) WB (t) RD (c)      Transfer Training PT LTG    Transfer Training PT LTG, Date Established 09/08/17  -RD (r) WB (t) RD (c)      Transfer Training PT LTG, Time to Achieve 2 - 3 days  -RD (r) WB (t) RD (c)      Transfer Training PT LTG, Activity Type all transfers  -RD (r) WB (t) RD (c)      Transfer Training PT LTG, [place] Level independent  -RD (r) WB (t) RD (c)       Transfer Training PT LTG, Assist Device walker, rolling  -RD (r) WB (t) RD (c)      Gait Training PT LTG    Gait Training Goal PT LTG, Date Established 09/08/17  -RD (r) WB (t) RD (c)      Gait Training Goal PT LTG, Time to Achieve 2 - 3 days  -RD (r) WB (t) RD (c)      Gait Training Goal PT LTG, [place] Level independent  -RD (r) WB (t) RD (c)      Gait Training Goal PT LTG, Assist Device walker, rolling  -RD (r) WB (t) RD (c)      Stair Training PT LTG    Stair Training Goal PT LTG, Date Established 09/08/17  -RD (r) WB (t) RD (c)      Stair Training Goal PT LTG, Time to Achieve 2 - 3 days  -RD (r) WB (t) RD (c)      Stair Training Goal PT LTG, Number of Steps 2  -RD (r) WB (t) RD (c)      Stair Training Goal PT LTG, [place] Level supervision required  -RD (r) WB (t) RD (c)      Stair Training Goal PT LTG, Assist Device 1 handrail  -RD (r) WB (t) RD (c)        User Key  (r) = Recorded By, (t) = Taken By, (c) = Cosigned By    Initials Name Provider Type    RD Karon Vaughn, PT Physical Therapist    WB Janusz Chamberlain, PT Student PT Student                Outcome Measures       09/08/17 1000          How much help from another person do you currently need...    Turning from your back to your side while in flat bed without using bedrails? 4  -RD (r) WB (t) RD (c)      Moving from lying on back to sitting on the side of a flat bed without bedrails? 3  -RD (r) WB (t) RD (c)      Moving to and from a bed to a chair (including a wheelchair)? 3  -RD (r) WB (t) RD (c)      Standing up from a chair using your arms (e.g., wheelchair, bedside chair)? 3  -RD (r) WB (t) RD (c)      Climbing 3-5 steps with a railing? 3  -RD (r) WB (t) RD (c)      To walk in hospital room? 3  -RD (r) WB (t) RD (c)      AM-[facility] 6 Clicks Score 19  -RD (r) WB (t)      Functional Assessment    Outcome Measure Options AM-PAC 6 Clicks Basic Mobility (PT)  -RD (r) WB (t) RD (c)        User Key  (r) = Recorded By, (t) = Taken By, (c) = Cosigned  By    Initials Name Provider Type    LUTHER Vaughn, PT Physical Therapist    WB Janusz Chamberlain, PT Student PT Student           Time Calculation:         PT Charges       09/08/17 1037          Time Calculation    Start Time 0958  -RD (r) WB (t) RD (c)      Stop Time 1015  -RD (r) WB (t) RD (c)      Time Calculation (min) 17 min  -RD (r) WB (t)      PT Received On 09/08/17  -RD (r) WB (t) RD (c)      PT - Next Appointment 09/09/17  -RD (r) WB (t) RD (c)      PT Goal Re-Cert Due Date 09/11/17  -RD (r) WB (t) RD (c)        User Key  (r) = Recorded By, (t) = Taken By, (c) = Cosigned By    Initials Name Provider Type    LUTHER Vaughn, PT Physical Therapist    WB Janusz Chamberlain, PT Student PT Student          Therapy Charges for Today     Code Description Service Date Service Provider Modifiers Qty    93386442213 HC PT EVAL LOW COMPLEXITY 2 9/8/2017 Janusz Chamberlain PT Student GP 1    83900364250 HC PT THER PROC EA 15 MIN 9/8/2017 Janusz Chamberlain PT Student GP 1          PT G-Codes  Outcome Measure Options: AM-PAC 6 Clicks Basic Mobility (PT)      Janusz Chamberlain PT Student  9/8/2017

## 2017-09-08 NOTE — PLAN OF CARE
Problem: Patient Care Overview (Adult)  Goal: Plan of Care Review  Outcome: Ongoing (interventions implemented as appropriate)    09/08/17 0600   Coping/Psychosocial Response Interventions   Plan Of Care Reviewed With patient   Patient Care Overview   Progress progress toward functional goals as expected   Outcome Evaluation   Outcome Summary/Follow up Plan Ambulated well, assist x 2. Resisted taking pain pills until 0500, says he does not like taking narcotics. No nausea overnight. VSS Conitnuie to monitor.        Goal: Discharge Needs Assessment  Outcome: Ongoing (interventions implemented as appropriate)    Problem: Perioperative Period (Adult)  Goal: Signs and Symptoms of Listed Potential Problems Will be Absent or Manageable (Perioperative Period)  Outcome: Ongoing (interventions implemented as appropriate)    Problem: Pain, Acute (Adult)  Goal: Identify Related Risk Factors and Signs and Symptoms  Outcome: Ongoing (interventions implemented as appropriate)  Goal: Acceptable Pain Control/Comfort Level  Outcome: Ongoing (interventions implemented as appropriate)    Problem: Fall Risk (Adult)  Goal: Identify Related Risk Factors and Signs and Symptoms  Outcome: Ongoing (interventions implemented as appropriate)  Goal: Absence of Falls  Outcome: Ongoing (interventions implemented as appropriate)

## 2017-09-08 NOTE — THERAPY TREATMENT NOTE
Acute Care - Physical Therapy Treatment Note  [facility]     Patient Name: Julio Busby  : 1945  MRN: [id number]  Today's Date: 2017  Onset of Illness/Injury or Date of Surgery Date: 17  Date of Referral to PT: 17  Referring Physician: Gerson Oakley ()    Admit Date: 2017    Visit Dx:    ICD-10-CM ICD-9-CM   1. Difficulty walking R26.2 719.7     Patient Active Problem List   Diagnosis   • CVA (cerebrovascular accident)   • Diverticulosis of colon without hemorrhage   • Hyperlipidemia   • Essential hypertension   • Anemia due to blood loss, chronic   • DDD (degenerative disc disease), lumbar   • Neuropathy of right sciatic nerve   • B12 deficiency   • Spinal stenosis, lumbar region, with neurogenic claudication   • Spondylolisthesis at L4-L5 level   • Hx of cerebral infarction   • Cerebral amyloid angiopathy   • Stenosis, spinal, lumbar               Adult Rehabilitation Note       17 1459          Rehab Assessment/Intervention    Discipline physical therapist  -RD,WB,RD2      Document Type therapy note (daily note)  -RD,WB,RD2      Subjective Information agree to therapy;complains of;pain  -RD,WB,RD2      Patient Effort, Rehab Treatment good  -RD,WB,RD2      Symptoms Noted Comment Pt reported decreased pain to low back, B buttocks and posterior thighs from this am. No increase of pain with gait training.  -RD,WB,RD2      Precautions/Limitations fall precautions;spinal precautions;other (see comments)   s/p lumbar fusion decompression  -RD,WB,RD2      Recorded by [RD,WB,RD2] Karon Vaughn, PT (r) Janusz Chamberlain PT Student (t) Karon Vaughn, PT (c)      Pain Assessment    Pain Assessment 0-10  -RD,WB,RD2      Pain Score 3  -RD,WB,RD2      Post Pain Score 3  -RD,WB,RD2      Pain Type Acute pain;Surgical pain  -RD,WB,RD2      Pain Location Back   B buttocks and posterior thighs  -RD,WB,RD2      Pain Intervention(s) Repositioned;Rest;Ambulation/increased activity   -RD,WB,RD2      Recorded by [RD,WB,RD2] Karon Vaughn, PT (r) Janusz Chamberlain, PT Student (t) Karon Vaughn, PT (c)      Cognitive Assessment/Intervention    Current Cognitive/Communication Assessment functional  -RD,WB,RD2      Orientation Status oriented x 4  -RD,WB,RD2      Follows Commands/Answers Questions 100% of the time  -RD,WB,RD2      Personal Safety WNL/WFL  -RD,WB,RD2      Personal Safety Interventions fall prevention program maintained;gait belt;nonskid shoes/slippers when out of bed;supervised activity  -RD,WB,RD2      Recorded by [RD,WB,RD2] Karon Vaughn, PT (r) Janusz Chambrelain, PT Student (t) Karon Vaughn, PT (c)      Bed Mobility, Assessment/Treatment    Bed Mobility, Scoot/Bridge, [place] maximum assist (25% patient effort);2 person assist required   to facilitate positioning in bed  -RD,WB,RD2      Bed Mob, Supine to Sit, [place] supervision required  -RD,WB,RD2      Bed Mob, Sit to Supine, [place] supervision required  -RD,WB,RD2      Bed Mobility, Comment SBA for saety. Continued pt education re: log roll for spinal precautions. VC for task breakdown.  -RD,WB,RD2      Recorded by [RD,WB,RD2] Karon Vaughn, PT (r) Janusz Chamberlain, PT Student (t) Karon Vaughn, PT (c)      Transfer Assessment/Treatment    Transfers, Sit-Stand [place] contact guard assist  -RD,WB,RD2      Transfers, Stand-Sit [place] contact guard assist  -RD,WB,RD2      Transfers, Sit-Stand-Sit, Assist Device rolling walker  -RD,WB,RD2      Transfer, Comment VC not required. Good carryover of task breakdown from this am.  -RD,WB,RD2      Recorded by [RD,WB,RD2] Karon Vaughn, PT (r) Janusz Chamberlain, PT Student (t) Karon Vaughn, PT (c)      Gait Assessment/Treatment    Gait, [place] Level contact guard assist  -RD,WB,RD2      Gait, Assistive Device rolling walker  -RD,WB,RD2      Gait, Distance (Feet) 310  -RD,WB,RD2      Gait, Gait Pattern Analysis swing-through gait   -RD,WB,RD2      Gait, Gait Deviations antalgic;[place] decreased;step length decreased;stride length decreased;other (see comments)   LLE ER  -RD,WB,RD2      Gait, Comment CGA for safety. Verbal cues/pt education to faciliate safe turning with Rwx.  -RD,WB,RD2      Recorded by [RD,WB,RD2] Karon Vaughn, PT (r) Janusz Chamberlain, PT Student (t) Karon Vaughn, PT (c)      Therapy Exercises    Bilateral Lower Extremities AROM:;10 reps;sitting;ankle pumps/circles;hip flexion;LAQ  -RD,WB,RD2      Recorded by [RD,WB,RD2] Karon Vaughn, PT (r) Janusz Chamberlain, PT Student (t) Karon Vaughn, PT (c)      Positioning and Restraints    Pre-Treatment Position in bed  -RD,WB,RD2      Post Treatment Position bed  -RD,WB,RD2      In Bed notified nsg;supine;call light within reach;encouraged to call for assist;exit alarm on;with family/caregiver;with nsg   all lines intact; BLE SCD's   -RD,WB,RD2      Recorded by [RD,WB,RD2] Karon Vaughn, PT (r) Janusz Chamberlain, PT Student (t) Karon Vaughn, PT (c)        User Key  (r) = Recorded By, (t) = Taken By, (c) = Cosigned By    Initials Name Effective Dates    LUTHER Vaughn, PT 10/06/15 -     WB Janusz Chamberlain, PT Student 08/21/17 -                 IP PT Goals       09/08/17 1033          Bed Mobility PT LTG    Bed Mobility PT LTG, Date Established 09/08/17  -RD (r) WB (t) RD (c)      Bed Mobility PT LTG, Time to Achieve 2 - 3 days  -RD (r) WB (t) RD (c)      Bed Mobility PT LTG, Activity Type all bed mobility  -RD (r) WB (t) RD (c)      Bed Mobility PT LTG, [place] Level independent  -RD (r) WB (t) RD (c)      Transfer Training PT LTG    Transfer Training PT LTG, Date Established 09/08/17  -RD (r) WB (t) RD (c)      Transfer Training PT LTG, Time to Achieve 2 - 3 days  -RD (r) WB (t) RD (c)      Transfer Training PT LTG, Activity Type all transfers  -RD (r) WB (t) RD (c)      Transfer Training PT LTG, [place] Level independent  -RD (r) WB (t) RD (c)       Transfer Training PT LTG, Assist Device walker, rolling  -RD (r) WB (t) RD (c)      Gait Training PT LTG    Gait Training Goal PT LTG, Date Established 09/08/17  -RD (r) WB (t) RD (c)      Gait Training Goal PT LTG, Time to Achieve 2 - 3 days  -RD (r) WB (t) RD (c)      Gait Training Goal PT LTG, [place] Level independent  -RD (r) WB (t) RD (c)      Gait Training Goal PT LTG, Assist Device walker, rolling  -RD (r) WB (t) RD (c)      Stair Training PT LTG    Stair Training Goal PT LTG, Date Established 09/08/17  -RD (r) WB (t) RD (c)      Stair Training Goal PT LTG, Time to Achieve 2 - 3 days  -RD (r) WB (t) RD (c)      Stair Training Goal PT LTG, Number of Steps 2  -RD (r) WB (t) RD (c)      Stair Training Goal PT LTG, [place] Level supervision required  -RD (r) WB (t) RD (c)      Stair Training Goal PT LTG, Assist Device 1 handrail  -RD (r) WB (t) RD (c)        User Key  (r) = Recorded By, (t) = Taken By, (c) = Cosigned By    Initials Name Provider Type    LUTHER Vaughn, PT Physical Therapist    ESCOBAR Chamberlain, PT Student PT Student          Physical Therapy Education     Title: PT OT SLP Therapies (Done)     Topic: Physical Therapy (Done)     Point: Mobility training (Done)    Learning Progress Summary    Learner Readiness Method Response Comment Documented by Status   Patient Acceptance E,D VU,NR  WB 09/08/17 1510 Done    Acceptance E,D VU,NR  WB 09/08/17 1033 Done   Family Acceptance E,D VU,NR  WB 09/08/17 1510 Done    Acceptance E,D VU,NR  WB 09/08/17 1033 Done               Point: Home exercise program (Done)    Learning Progress Summary    Learner Readiness Method Response Comment Documented by Status   Patient Acceptance E,D VU,NR  WB 09/08/17 1510 Done   Family Acceptance E,D VU,NR  WB 09/08/17 1510 Done               Point: Body mechanics (Done)    Learning Progress Summary    Learner Readiness Method Response Comment Documented by Status   Patient Acceptance E,D VU,NR  WB 09/08/17 1510  Done    Acceptance ESUMMER VU,NR  WB 09/08/17 1033 Done   Family Acceptance ED VU,NR  WB 09/08/17 1510 Done    Acceptance ED VU,NR  WB 09/08/17 1033 Done               Point: Precautions (Done)    Learning Progress Summary    Learner Readiness Method Response Comment Documented by Status   Patient Acceptance ESUMMER,NR  WB 09/08/17 1510 Done    Acceptance ED VU,NR  WB 09/08/17 1033 Done   Family Acceptance ED VU,NR  WB 09/08/17 1510 Done    Acceptance ED VU,NR  WB 09/08/17 1033 Done                      User Key     Initials Effective Dates Name Provider Type Discipline     08/21/17 -  Janusz Chamberlain, PT Student PT Student PT                    PT Recommendation and Plan  Anticipated Equipment Needs At Discharge: other (see comments) (Rwx)  Anticipated Discharge Disposition: home with assist, home with home health  Planned Therapy Interventions: balance training, bed mobility training, gait training, home exercise program, neuromuscular re-education, patient/family education, postural re-education, strengthening, stair training, transfer training  PT Frequency: 2 times/day  Plan of Care Review  Plan Of Care Reviewed With: (P) patient, family  Progress: (P) improving  Outcome Summary/Follow up Plan: (P) Improved activity tolerance and functional mobility through progression of ther. ex. AROM BLE (10 reps, ankle pumps, hip flexion, LAQs), increased gait distance (310, CGA using Rwx), and decreased verbal cueing for sit <> stand t/f (CGA using Rwx).          Outcome Measures       09/08/17 1500 09/08/17 1000       How much help from another person do you currently need...    Turning from your back to your side while in flat bed without using bedrails? (P)  3  -WB 4  -RD (r) WB (t) RD (c)     Moving from lying on back to sitting on the side of a flat bed without bedrails? (P)  3  -WB 3  -RD (r) WB (t) RD (c)     Moving to and from a bed to a chair (including a wheelchair)? (P)  3  -WB 3  -RD (r) WB (t) RD (c)      Standing up from a chair using your arms (e.g., wheelchair, bedside chair)? (P)  3  -WB 3  -RD (r) WB (t) RD (c)     Climbing 3-5 steps with a railing? (P)  3  -WB 3  -RD (r) WB (t) RD (c)     To walk in hospital room? (P)  3  -WB 3  -RD (r) WB (t) RD (c)     AM-PAC 6 Clicks Score (P)  18  -WB 19  -RD (r) WB (t)     Functional Assessment    Outcome Measure Options (P)  AM-PAC 6 Clicks Basic Mobility (PT)  -WB AM-PAC 6 Clicks Basic Mobility (PT)  -RD (r) WB (t) RD (c)       User Key  (r) = Recorded By, (t) = Taken By, (c) = Cosigned By    Initials Name Provider Type    LUTHER Vaughn, PT Physical Therapist    WB Janusz Chamberlain, PT Student PT Student           Time Calculation:         PT Charges       09/08/17 1513 09/08/17 1038 09/08/17 1037    Time Calculation    Start Time (P)  1434  -WB  0958  -RD (r) WB (t) RD (c)    Stop Time (P)  1456  -WB  1015  -RD (r) WB (t) RD (c)    Time Calculation (min) (P)  22 min  -WB  17 min  -RD (r) WB (t)    PT Received On (P)  09/08/17  -WB  09/08/17  -RD (r) WB (t) RD (c)    PT - Next Appointment (P)  09/09/17  -WB 09/08/17  -RD 09/09/17  -RD (r) WB (t) RD (c)    PT Goal Re-Cert Due Date   09/11/17  -RD (r) WB (t) RD (c)      User Key  (r) = Recorded By, (t) = Taken By, (c) = Cosigned By    Initials Name Provider Type    LUTHER Vaughn, PT Physical Therapist    WB Janusz Chamberlain, PT Student PT Student          Therapy Charges for Today     Code Description Service Date Service Provider Modifiers Qty    51963193361 HC PT EVAL LOW COMPLEXITY 2 9/8/2017 Janusz Chamberlain, PT Student GP 1    42818015491 HC PT THER PROC EA 15 MIN 9/8/2017 Janusz Chamberlain, PT Student GP 1    22311026198 HC PT THER PROC EA 15 MIN 9/8/2017 Janusz Chamberlain, PT Student GP 1          PT G-Codes  Outcome Measure Options: (P) AM-PAC 6 Clicks Basic Mobility (PT)    Janusz Chamberlain, PT Student  9/8/2017

## 2017-09-08 NOTE — PROGRESS NOTES
LOS: 1 day   Patient Care Team:  Sidney Brandt MD as PCP - General (Family Medicine)    Chief Complaint:     Post op visit; back pain     Subjective     Back Pain   This is a new problem. The current episode started yesterday. The problem occurs constantly. The problem is unchanged. The pain is present in the lumbar spine. The pain radiates to the left foot. The pain is moderate. The symptoms are aggravated by position. Pertinent negatives include no abdominal pain (Holland removed this am; has not yet voided. ) or bladder incontinence. The treatment provided moderate relief.       Subjective    History taken from: patient chart family    Objective     Vital Signs  Temp:  [97.6 °F (36.4 °C)-98.8 °F (37.1 °C)] 97.6 °F (36.4 °C)  Heart Rate:  [53-81] 62  Resp:  [16-18] 16  BP: ()/(58-94) 106/62    Objective    Results Review:     I reviewed the patient's new clinical results.     Results for JESS TANG (MRN [id number]) as of 9/8/2017 15:34   Ref. Range 9/8/2017 06:03   WBC Latest Ref Range: 4.50 - 10.70 10*3/mm3 8.76   RBC Latest Ref Range: 4.60 - 6.00 10*6/mm3 3.55 (L)   Hemoglobin Latest Ref Range: 13.7 - 17.6 g/dL 10.5 (L)   Hematocrit Latest Ref Range: 40.4 - 52.2 % 32.2 (L)       Medication Review:     DC PCA pump    Assessment/Plan     Active Problems:    Stenosis, spinal, lumbar      Assessment & Plan     POD 1 L4/5 decompression with PLIF, cages and Sextant pedicle screw fixation    Continue to mobilize  Holland out  DC PCA pump  Home probably Monday  CBC in am    Inna Damon, PAULINE  09/08/17  3:27 PM

## 2017-09-08 NOTE — PROGRESS NOTES
Continued Stay Note  [facility]     Patient Name: Julio Busby  MRN: [id number]  Today's Date: 9/8/2017    Admit Date: 9/7/2017          Discharge Plan       09/08/17 1536    Case Management/Social Work Plan    Additional Comments Verified facesheet info.      09/08/17 1521    Case Management/Social Work Plan    Plan Home with spouse and [facility] if needed    Patient/Family In Agreement With Plan yes    Additional Comments Met with patient and spouse.  Plan is to return home at discharge.  Ordered rolling walker and 3:1 commode from [facility] to be delivered to [facility].  Patient uses [facility] at [place].  Will follow              Discharge Codes     None            Caty Landers RN

## 2017-09-08 NOTE — PLAN OF CARE
Problem: Patient Care Overview (Adult)  Goal: Plan of Care Review  Outcome: Ongoing (interventions implemented as appropriate)    09/08/17 1613   Coping/Psychosocial Response Interventions   Plan Of Care Reviewed With patient;spouse   Outcome Evaluation   Outcome Summary/Follow up Plan Patient up with assist x 1. F/C taken out this morning, still has not urinated but bladder scan was 127. VS stable. No complaints of pain or nausea.         Problem: Fall Risk (Adult)  Goal: Identify Related Risk Factors and Signs and Symptoms  Outcome: Ongoing (interventions implemented as appropriate)  Goal: Absence of Falls  Outcome: Ongoing (interventions implemented as appropriate)    09/08/17 1613   Fall Risk (Adult)   Absence of Falls making progress toward outcome

## 2017-09-08 NOTE — PLAN OF CARE
Problem: Patient Care Overview (Adult)  Goal: Plan of Care Review    09/08/17 1510   Coping/Psychosocial Response Interventions   Plan Of Care Reviewed With patient;family   Patient Care Overview   Progress improving   Outcome Evaluation   Outcome Summary/Follow up Plan Improved activity tolerance and functional mobility through progression of ther. ex. AROM BLE (10 reps, ankle pumps, hip flexion, LAQs), increased gait distance (310, CGA using Rwx), and decreased verbal cueing for sit <> stand t/f (CGA using Rwx).

## 2017-09-08 NOTE — PROGRESS NOTES
Discharge Planning Assessment  [facility]     Patient Name: Julio Busby  MRN: [id number]  Today's Date: 9/8/2017    Admit Date: 9/7/2017          Discharge Needs Assessment       09/08/17 1520    Living Environment    Lives With spouse    Living Arrangements house    Home Accessibility bed and bath on same level;stairs (1 railing present)    Type of Financial/Environmental Concern none    Transportation Available car;family or friend will provide    Living Environment    Provides Primary Care For no one    Quality Of Family Relationships supportive    Able to Return to Prior Living Arrangements yes    Discharge Needs Assessment    Concerns To Be Addressed no discharge needs identified    Readmission Within The Last 30 Days no previous admission in last 30 days    Equipment Currently Used at Home none    Equipment Needed After Discharge commode;walker, rolling    Discharge Facility/Level Of Care Needs home with home health    Discharge Disposition home or self-care;home healthcare service            Discharge Plan       09/08/17 1521    Case Management/Social Work Plan    Plan Home with spouse and [facility] if needed    Patient/Family In Agreement With Plan yes    Additional Comments Met with patient and spouse.  Plan is to return home at discharge.  Ordered rolling walker and 3:1 commode from [facility] to be delivered to room.  Patient uses [facility] at [place].  Will follow        Discharge Placement     No information found                Demographic Summary       09/08/17 1504    Referral Information    Admission Type inpatient    Arrived From admitted as an inpatient    Referral Source admission list    Reason For Consult discharge planning    Record Reviewed medical record    Primary Care Physician Information    Name Dr. Sidney Brandt            Functional Status       09/08/17 1504    Functional Status Current    Ambulation 3-->assistive equipment and person    Transferring 3-->assistive equipment and  person    Toileting 2-->assistive person    Bathing 2-->assistive person    Dressing 2-->assistive person    Eating 0-->independent    Communication 0-->understands/communicates without difficulty    Change in Functional Status Since Onset of Current Illness/Injury yes    Functional Status Prior    Ambulation 0-->independent    Transferring 0-->independent    Toileting 0-->independent    Bathing 0-->independent    Dressing 0-->independent    Eating 0-->independent    Communication 0-->understands/communicates without difficulty    Swallowing 0-->swallows foods/liquids without difficulty    IADL    Medications independent    Meal Preparation independent    Housekeeping assistive person    Laundry assistive person    Shopping assistive person    Oral Care independent    Activity Tolerance    Current Activity Limitations spinal precautions    Usual Activity Tolerance good    Current Activity Tolerance moderate    Cognitive/Perceptual/Developmental    Current Mental Status/Cognitive Functioning no deficits noted    Recent Changes in Mental Status/Cognitive Functioning no changes            Psychosocial     None            Abuse/Neglect     None            Legal     None            Substance Abuse     None            Patient Forms     None          Caty Landers RN

## 2017-09-09 LAB
DEPRECATED RDW RBC AUTO: 45.8 FL (ref 37–54)
ERYTHROCYTE [DISTWIDTH] IN BLOOD BY AUTOMATED COUNT: 13.5 % (ref 11.5–14.5)
HCT VFR BLD AUTO: 33.2 % (ref 40.4–52.2)
HGB BLD-MCNC: 10.6 G/DL (ref 13.7–17.6)
MCH RBC QN AUTO: 29.7 PG (ref 27–32.7)
MCHC RBC AUTO-ENTMCNC: 31.9 G/DL (ref 32.6–36.4)
MCV RBC AUTO: 93 FL (ref 79.8–96.2)
PLATELET # BLD AUTO: 135 10*3/MM3 (ref 140–500)
PMV BLD AUTO: 11 FL (ref 6–12)
RBC # BLD AUTO: 3.57 10*6/MM3 (ref 4.6–6)
WBC NRBC COR # BLD: 7.15 10*3/MM3 (ref 4.5–10.7)

## 2017-09-09 PROCEDURE — 85027 COMPLETE CBC AUTOMATED: CPT | Performed by: NURSE PRACTITIONER

## 2017-09-09 PROCEDURE — 97110 THERAPEUTIC EXERCISES: CPT

## 2017-09-09 PROCEDURE — 99024 POSTOP FOLLOW-UP VISIT: CPT | Performed by: NURSE PRACTITIONER

## 2017-09-09 RX ADMIN — DOCUSATE SODIUM 200 MG: 100 CAPSULE, LIQUID FILLED ORAL at 17:44

## 2017-09-09 RX ADMIN — HYDROCODONE BITARTRATE AND ACETAMINOPHEN 2 TABLET: 5; 325 TABLET ORAL at 22:01

## 2017-09-09 RX ADMIN — DOCUSATE SODIUM 200 MG: 100 CAPSULE, LIQUID FILLED ORAL at 08:27

## 2017-09-09 RX ADMIN — HYDROCODONE BITARTRATE AND ACETAMINOPHEN 2 TABLET: 5; 325 TABLET ORAL at 08:27

## 2017-09-09 RX ADMIN — DONEPEZIL HYDROCHLORIDE 10 MG: 10 TABLET, FILM COATED ORAL at 22:01

## 2017-09-09 RX ADMIN — ATORVASTATIN CALCIUM 20 MG: 20 TABLET, FILM COATED ORAL at 08:27

## 2017-09-09 NOTE — THERAPY TREATMENT NOTE
Acute Care - Physical Therapy Treatment Note  [facility]     Patient Name: Julio Busby  : 1945  MRN: [id number]  Today's Date: 2017  Onset of Illness/Injury or Date of Surgery Date: 17  Date of Referral to PT: 17  Referring Physician: Gerson Oakley ()    Admit Date: 2017    Visit Dx:    ICD-10-CM ICD-9-CM   1. Difficulty walking R26.2 719.7   2. Stenosis, spinal, lumbar M48.06 724.02     Patient Active Problem List   Diagnosis   • CVA (cerebrovascular accident)   • Diverticulosis of colon without hemorrhage   • Hyperlipidemia   • Essential hypertension   • Anemia due to blood loss, chronic   • DDD (degenerative disc disease), lumbar   • Neuropathy of right sciatic nerve   • B12 deficiency   • Spinal stenosis, lumbar region, with neurogenic claudication   • Spondylolisthesis at L4-L5 level   • Hx of cerebral infarction   • Cerebral amyloid angiopathy   • Stenosis, spinal, lumbar               Adult Rehabilitation Note       17 1100 17 1459       Rehab Assessment/Intervention    Discipline physical therapy assistant  - physical therapist  -RD,WB,RD2     Document Type therapy note (daily note)  - therapy note (daily note)  -RD,WB,RD2     Subjective Information agree to therapy;complains of;weakness;fatigue;pain  - agree to therapy;complains of;pain  -RD,WB,RD2     Patient Effort, Rehab Treatment good  -RH good  -RD,WB,RD2     Symptoms Noted During/After Treatment none  -RH      Symptoms Noted Comment  Pt reported decreased pain to low back, B buttocks and posterior thighs from this am. No increase of pain with gait training.  -RD,WB,RD2     Precautions/Limitations fall precautions;spinal precautions  -RH fall precautions;spinal precautions;other (see comments)   s/p lumbar fusion decompression  -RD,WB,RD2     Precautions/Limitations, Vision WFL  -RH      Precautions/Limitations, Hearing WFL  -RH      Recorded by [RH] Barrie Grimaldo PTA [RD,WB,RD2]  Karon Vaughn, PT (r) Janusz Chamberlain, PT Student (t) Karon Vaughn, PT (c)     Pain Assessment    Pain Assessment 0-10  -RH 0-10  -RD,WB,RD2     Pain Score 5  -RH 3  -RD,WB,RD2     Post Pain Score 5  -RH 3  -RD,WB,RD2     Pain Type Acute pain  -RH Acute pain;Surgical pain  -RD,WB,RD2     Pain Location Back  -RH Back   B buttocks and posterior thighs  -RD,WB,RD2     Pain Intervention(s)  Repositioned;Rest;Ambulation/increased activity  -RD,WB,RD2     Recorded by [RH] Barrie Grimaldo PTA [RD,WB,RD2] Karon Vaughn, PT (r) Janusz Chamberlain, PT Student (t) Karon Vaughn, PT (c)     Cognitive Assessment/Intervention    Current Cognitive/Communication Assessment functional  -RH functional  -RD,WB,RD2     Orientation Status  oriented x 4  -RD,WB,RD2     Follows Commands/Answers Questions  100% of the time  -RD,WB,RD2     Personal Safety  WNL/WFL  -RD,WB,RD2     Personal Safety Interventions  fall prevention program maintained;gait belt;nonskid shoes/slippers when out of bed;supervised activity  -RD,WB,RD2     Recorded by [RH] Barrie Grimaldo PTA [RD,WB,RD2] Karon Vaughn, PT (r) Janusz Chamberlain, PT Student (t) Karon Vaughn, PT (c)     ROM (Range of Motion)    General ROM Detail --   WFL  -RH      Recorded by [RH] Barrie Grimaldo PTA      Bed Mobility, Assessment/Treatment    Bed Mobility, Scoot/Bridge, [place] supervision required;verbal cues required  - maximum assist (25% patient effort);2 person assist required   to facilitate positioning in bed  -RD,WB,RD2     Bed Mob, Supine to Sit, [place] supervision required;verbal cues required  -RH supervision required  -RD,WB,RD2     Bed Mob, Sit to Supine, [place] supervision required;verbal cues required  -RH supervision required  -RD,WB,RD2     Bed Mobility, Comment  SBA for saety. Continued pt education re: log roll for spinal precautions. VC for task breakdown.  -RD,WB,RD2     Recorded by [RH] Barrie Grimaldo PTA [RD,WB,RD2]  Kaorn Vaughn, PT (r) Janusz Chamberlain, PT Student (t) Karon Vaughn, PT (c)     Transfer Assessment/Treatment    Transfers, Sit-Stand [place] verbal cues required;stand by assist  -RH contact guard assist  -RD,WB,RD2     Transfers, Stand-Sit [place] supervision required;verbal cues required  -RH contact guard assist  -RD,WB,RD2     Transfers, Sit-Stand-Sit, Assist Device  rolling walker  -RD,WB,RD2     Transfer, Comment SPS SBA/CGA  -RH VC not required. Good carryover of task breakdown from this am.  -RD,WB,RD2     Recorded by [RH] Barrie Grimaldo, PTA [RD,WB,RD2] Karon Vaughn, PT (r) Janusz Chamberlain, PT Student (t) Karon Vaughn, PT (c)     Gait Assessment/Treatment    Gait, [place] Level stand by assist;contact guard assist  -RH contact guard assist  -RD,WB,RD2     Gait, Assistive Device --   200' with no AD with CGA and 250' with FWW SBA/CGA  -RH rolling walker  -RD,WB,RD2     Gait, Distance (Feet) 450  -  -RD,WB,RD2     Gait, Gait Pattern Analysis swing-through gait  -RH swing-through gait  -RD,WB,RD2     Gait, Gait Deviations antalgic;[place] decreased;decreased heel strike;forward flexed posture;step length decreased  -RH antalgic;[place] decreased;step length decreased;stride length decreased;other (see comments)   LLE ER  -RD,WB,RD2     Gait, Safety Issues step length decreased  -RH      Gait, Impairments strength decreased;impaired balance  -RH      Gait, Comment  CGA for safety. Verbal cues/pt education to faciliate safe turning with Rwx.  -RD,WB,RD2     Recorded by [RH] Barrie Grimaldo, PTA [RD,WB,RD2] Karon Vaughn, PT (r) Janusz Chamberlain, PT Student (t) Karon Vaughn, PT (c)     Stairs Assessment/Treatment    Number of Stairs 6  -RH      Stairs, Handrail Location left side (ascending)  -RH      Stairs, [place] Level contact guard assist  -RH      Stairs, Technique Used step over step (ascending);step over step (descending);step to step (ascending);step to  step (descending)   side step and forward step performed   -RH      Stairs, Impairments strength decreased;impaired balance  -RH      Recorded by [RH] Barrie Grimaldo PTA      Balance Skills Training    Sitting-Level of Assistance Close supervision  -RH      Sitting-Balance Support Feet supported  -RH      Sitting-Balance Activities Trunk control activities  -RH      Sitting # of Minutes 5  -RH      Standing-Level of Assistance Close supervision  -RH      Static Standing Balance Support No upper extremity supported  -RH      Standing-Balance Activities --   static  -RH      Standing Balance # of Minutes 5  -RH      Recorded by [RH] Barrie Grimaldo PTA      Therapy Exercises    Bilateral Lower Extremities  AROM:;10 reps;sitting;ankle pumps/circles;hip flexion;LAQ  -RD,WB,RD2     Recorded by  [RD,WB,RD2] Karon Vaughn, PT (r) Janusz Chamberlain, PT Student (t) Karon Vaughn, PT (c)     Positioning and Restraints    Pre-Treatment Position in bed  -RH in bed  -RD,WB,RD2     Post Treatment Position bed  -RH bed  -RD,WB,RD2     In Bed supine  -RH notified nsg;supine;call light within reach;encouraged to call for assist;exit alarm on;with family/caregiver;with nsg   all lines intact; BLE SCD's   -RD,WB,RD2     Recorded by [RH] Barrie Grimaldo, SALVATORE [RD,WB,RD2] Karon Vaughn, PT (r) Janusz Chamberlain, PT Student (t) Karon Vaughn, PT (c)       User Key  (r) = Recorded By, (t) = Taken By, (c) = Cosigned By    Initials Name Effective Dates    RD Karon Vaughn, PT 10/06/15 -      Barrie Grimaldo, PTA 02/18/16 -     ESCOBAR Chamberlain, PT Student 08/21/17 -                 IP PT Goals       09/08/17 1033          Bed Mobility PT LTG    Bed Mobility PT LTG, Date Established 09/08/17  -RD (r) WB (t) RD (c)      Bed Mobility PT LTG, Time to Achieve 2 - 3 days  -RD (r) WB (t) RD (c)      Bed Mobility PT LTG, Activity Type all bed mobility  -RD (r) WB (t) RD (c)      Bed Mobility PT LTG, [place] Level  independent  -RD (r) WB (t) RD (c)      Transfer Training PT LTG    Transfer Training PT LTG, Date Established 09/08/17  -RD (r) WB (t) RD (c)      Transfer Training PT LTG, Time to Achieve 2 - 3 days  -RD (r) WB (t) RD (c)      Transfer Training PT LTG, Activity Type all transfers  -RD (r) WB (t) RD (c)      Transfer Training PT LTG, [place] Level independent  -RD (r) WB (t) RD (c)      Transfer Training PT LTG, Assist Device walker, rolling  -RD (r) WB (t) RD (c)      Gait Training PT LTG    Gait Training Goal PT LTG, Date Established 09/08/17  -RD (r) WB (t) RD (c)      Gait Training Goal PT LTG, Time to Achieve 2 - 3 days  -RD (r) WB (t) RD (c)      Gait Training Goal PT LTG, [place] Level independent  -RD (r) WB (t) RD (c)      Gait Training Goal PT LTG, Assist Device walker, rolling  -RD (r) WB (t) RD (c)      Stair Training PT LTG    Stair Training Goal PT LTG, Date Established 09/08/17  -RD (r) WB (t) RD (c)      Stair Training Goal PT LTG, Time to Achieve 2 - 3 days  -RD (r) WB (t) RD (c)      Stair Training Goal PT LTG, Number of Steps 2  -RD (r) WB (t) RD (c)      Stair Training Goal PT LTG, [place] Level supervision required  -RD (r) WB (t) RD (c)      Stair Training Goal PT LTG, Assist Device 1 handrail  -RD (r) WB (t) RD (c)        User Key  (r) = Recorded By, (t) = Taken By, (c) = Cosigned By    Initials Name Provider Type    LUTHER Vaughn, PT Physical Therapist    SECOBAR Chamberlain PT Student PT Student          Physical Therapy Education     Title: PT OT SLP Therapies (Done)     Topic: Physical Therapy (Done)     Point: Mobility training (Done)    Learning Progress Summary    Learner Readiness Method Response Comment Documented by Status   Patient Acceptance SUMMER FOURNIER,NR  WB 09/08/17 1510 Done    Acceptance SUMMER FOURNIERNR  WB 09/08/17 1033 Done   Family Acceptance SUMMER FOURNIER NR   09/08/17 1510 Done    Acceptance SUMMER FOURNIER NR   09/08/17 1033 Done               Point: Home exercise program  (Done)    Learning Progress Summary    Learner Readiness Method Response Comment Documented by Status   Patient Acceptance E,D VU,NR  WB 09/08/17 1510 Done   Family Acceptance E,D VU,NR  WB 09/08/17 1510 Done               Point: Body mechanics (Done)    Learning Progress Summary    Learner Readiness Method Response Comment Documented by Status   Patient Acceptance E,D VU,NR  WB 09/08/17 1510 Done    Acceptance E,D VU,NR  WB 09/08/17 1033 Done   Family Acceptance E,D VU,NR  WB 09/08/17 1510 Done    Acceptance E,D VU,NR  WB 09/08/17 1033 Done               Point: Precautions (Done)    Learning Progress Summary    Learner Readiness Method Response Comment Documented by Status   Patient Acceptance E,D VU,NR  WB 09/08/17 1510 Done    Acceptance E,D VU,NR  WB 09/08/17 1033 Done   Family Acceptance E,D VU,NR  WB 09/08/17 1510 Done    Acceptance E,D VU,NR  WB 09/08/17 1033 Done                      User Key     Initials Effective Dates Name Provider Type Discipline     08/21/17 -  Janusz Chamberlain, PT Student PT Student PT                    PT Recommendation and Plan  Anticipated Equipment Needs At Discharge: other (see comments) (Rwx)  Anticipated Discharge Disposition: home with assist, home with home health  Planned Therapy Interventions: balance training, bed mobility training, gait training, home exercise program, neuromuscular re-education, patient/family education, postural re-education, strengthening, stair training, transfer training  PT Frequency: 2 times/day  Plan of Care Review  Plan Of Care Reviewed With: patient  Progress: improving          Outcome Measures       09/09/17 1100 09/08/17 1500 09/08/17 1000    How much help from another person do you currently need...    Turning from your back to your side while in flat bed without using bedrails? 3  -RH 3  -RD (r) WB (t) RD (c) 4  -RD (r) WB (t) RD (c)    Moving from lying on back to sitting on the side of a flat bed without bedrails? 3  -RH 3  -RD (r) WB (t)  RD (c) 3  -RD (r) WB (t) RD (c)    Moving to and from a bed to a chair (including a wheelchair)? 3  -RH 3  -RD (r) WB (t) RD (c) 3  -RD (r) WB (t) RD (c)    Standing up from a chair using your arms (e.g., wheelchair, bedside chair)? 3  -RH 3  -RD (r) WB (t) RD (c) 3  -RD (r) WB (t) RD (c)    Climbing 3-5 steps with a railing? 3  -RH 3  -RD (r) WB (t) RD (c) 3  -RD (r) WB (t) RD (c)    To walk in hospital room? 3  -RH 3  -RD (r) WB (t) RD (c) 3  -RD (r) WB (t) RD (c)    AM-PAC 6 Clicks Score 18  -RH 18  -RD (r) WB (t) 19  -RD (r) WB (t)    Functional Assessment    Outcome Measure Options  AM-PAC 6 Clicks Basic Mobility (PT)  -RD (r) WB (t) RD (c) AM-PAC 6 Clicks Basic Mobility (PT)  -RD (r) WB (t) RD (c)      User Key  (r) = Recorded By, (t) = Taken By, (c) = Cosigned By    Initials Name Provider Type    RD Karon Vaughn, PT Physical Therapist     Barrie Grimaldo, SALVATORE Physical Therapy Assistant    WB Janusz Chamberlain, PT Student PT Student           Time Calculation:         PT Charges       09/09/17 1127          Time Calculation    Start Time 1050  -      Stop Time 1125  -      Time Calculation (min) 35 min  -      PT Received On 09/09/17  -      PT - Next Appointment 09/10/17  -        User Key  (r) = Recorded By, (t) = Taken By, (c) = Cosigned By    Initials Name Provider Type     Barrie Grimaldo PTA Physical Therapy Assistant          Therapy Charges for Today     Code Description Service Date Service Provider Modifiers Qty    40356725201 HC PT THER PROC EA 15 MIN 9/9/2017 Barrie Grimaldo PTA GP 2          PT G-Codes  Outcome Measure Options: AM-PAC 6 Clicks Basic Mobility (PT)    Barrie Grimaldo PTA  9/9/2017

## 2017-09-09 NOTE — PLAN OF CARE
Problem: Patient Care Overview (Adult)  Goal: Plan of Care Review  Outcome: Ongoing (interventions implemented as appropriate)    09/09/17 1724   Coping/Psychosocial Response Interventions   Plan Of Care Reviewed With patient;spouse;family   Patient Care Overview   Progress improving   Outcome Evaluation   Outcome Summary/Follow up Plan Oriented but forgetful at times. Up with assist. PRN Norco for pain. Possible DC in AM.        Goal: Adult Individualization and Mutuality  Outcome: Ongoing (interventions implemented as appropriate)  Goal: Discharge Needs Assessment  Outcome: Ongoing (interventions implemented as appropriate)    Problem: Perioperative Period (Adult)  Goal: Signs and Symptoms of Listed Potential Problems Will be Absent or Manageable (Perioperative Period)  Outcome: Ongoing (interventions implemented as appropriate)    09/09/17 1724   Perioperative Period   Problems Assessed (Perioperative Period) all   Problems Present (Perioperative Period) pain         Problem: Pain, Acute (Adult)  Goal: Identify Related Risk Factors and Signs and Symptoms  Outcome: Ongoing (interventions implemented as appropriate)    09/09/17 1724   Pain, Acute   Related Risk Factors (Acute Pain) surgery   Signs and Symptoms (Acute Pain) verbalization of pain descriptors       Goal: Acceptable Pain Control/Comfort Level  Outcome: Ongoing (interventions implemented as appropriate)    09/09/17 1724   Pain, Acute (Adult)   Acceptable Pain Control/Comfort Level making progress toward outcome         Problem: Fall Risk (Adult)  Goal: Identify Related Risk Factors and Signs and Symptoms  Outcome: Ongoing (interventions implemented as appropriate)    09/09/17 1724   Fall Risk   Fall Risk: Related Risk Factors neuro disease/injury;environment unfamiliar   Fall Risk: Signs and Symptoms presence of risk factors       Goal: Absence of Falls  Outcome: Ongoing (interventions implemented as appropriate)    09/09/17 1724   Fall Risk (Adult)    Absence of Falls making progress toward outcome

## 2017-09-09 NOTE — PLAN OF CARE
Problem: Patient Care Overview (Adult)  Goal: Plan of Care Review  Outcome: Ongoing (interventions implemented as appropriate)    09/09/17 0606   Coping/Psychosocial Response Interventions   Plan Of Care Reviewed With patient   Patient Care Overview   Progress improving   Outcome Evaluation   Outcome Summary/Follow up Plan Patietn voiding well, incontinent at times. Pain well controlled, no confusion. No nausea, medicate for pain once this shift with good results

## 2017-09-09 NOTE — PROGRESS NOTES
[facility] PROGRESS NOTE    PATIENT IDENTIFICATION:   Name:  Julio Busby      MRN:  [id number]     71 y.o.  male               CC:POD #2 s/p L4/5 decomp and PLIF      Subjective     Interval History: has no complaint of pain in back or legs, c/o muscle tightness.    Objective     Vital signs in last 24 hours:  Temp:  [97.6 °F (36.4 °C)-98.6 °F (37 °C)] 98.2 °F (36.8 °C)  Heart Rate:  [58-72] 58  Resp:  [16] 16  BP: ()/(59-82) 98/59  ICP ranges-    Intake/Output last 3 shifts:  I/O last 3 completed shifts:  In: -   Out: 900 [Urine:900]    Intake/Output this shift:         Physical Exam:  General:   Awake, alert, and oriented x 3. NAD  Posterior bilateral lumbar incision sites covered in Steri-Strips, clean dry and intact  Able to turn in bed without much difficulty or discomfort  Moving all extremities well  CN III IV VI: Extraocular movements are full , PERRL   CN V: Normal facial sensation and strength of muscles of mastication.  CN VII: Facial movements are symmetric. No weakness.  Coordination: Finger to nose test shows no dysmetria.  Rapid alternating movements are normal.  Heel to shin normal.  Extremities:  Patient is wearing ESTHELA and SCD bilaterally    LABS:    Lab Results (last 24 hours)     Procedure Component Value Units Date/Time    CBC (No Diff) [[id number]]  (Abnormal) Collected:  09/09/17 0549    Specimen:  Blood Updated:  09/09/17 0645     WBC 7.15 10*3/mm3      RBC 3.57 (L) 10*6/mm3      Hemoglobin 10.6 (L) g/dL      Hematocrit 33.2 (L) %      MCV 93.0 fL      MCH 29.7 pg      MCHC 31.9 (L) g/dL      RDW 13.5 %      RDW-SD 45.8 fl      MPV 11.0 fL      Platelets 135 (L) 10*3/mm3           IMAGING STUDIES:    No new imaging    Meds reviewed/changed: Yes    Current Facility-Administered Medications   Medication Dose Route Frequency Provider Last Rate Last Dose   • acetaminophen (TYLENOL) tablet 650 mg  650 mg Oral Q4H PRN Gerson Jennings MD       • atorvastatin  (LIPITOR) tablet 20 mg  20 mg Oral Daily Gerson Jennings MD   20 mg at 09/09/17 0827   • cyclobenzaprine (FLEXERIL) tablet 10 mg  10 mg Oral TID PRN Gerson Jennings MD       • diazePAM (VALIUM) injection 5 mg  5 mg Intravenous Q4H PRN Gerson Jennings MD       • docusate sodium (COLACE) capsule 200 mg  200 mg Oral BID PAULINE Orta   200 mg at 09/09/17 0827   • donepezil (ARICEPT) tablet 10 mg  10 mg Oral Nightly Gerson Jennings MD   10 mg at 09/08/17 2052   • HYDROcodone-acetaminophen (NORCO) 5-325 MG per tablet 2 tablet  2 tablet Oral Q4H PRN Gerson Jennings MD   2 tablet at 09/09/17 0827   • Influenza Vac Subunit Quad (FLUCELVAX) injection 0.5 mL  0.5 mL Intramuscular During Hospitalization Gerson Jennings MD       • lisinopril (PRINIVIL,ZESTRIL) tablet 10 mg  10 mg Oral Daily Gerson Jennings MD   Stopped at 09/08/17 0856   • ondansetron (ZOFRAN) injection 4 mg  4 mg Intravenous Q6H PRN Gerson Jennings MD   4 mg at 09/07/17 1627   • promethazine (PHENERGAN) injection 25 mg  25 mg Intravenous Q6H PRN Laurita Gary PA-C       • sennosides-docusate sodium (SENOKOT-S) 8.6-50 MG tablet 1 tablet  1 tablet Oral Nightly PRN Gerson Jennings MD       • sodium chloride 0.9 % flush 1-10 mL  1-10 mL Intravenous PRN Gerson Jennings MD           Assessment/Plan     ASSESSMENT:    Active Problems:    Stenosis, spinal, lumbar      PLAN: CBC this AM stable- hbg 10.6, will recheck in AM. Working with PT, OOB, up in chair, Pain well controlled with oral narcotics.  Patient would like to shower, OK to do so covering Steri-Strips with Tegaderm dressing. Hopeful DC home soon    I discussed the patients findings and my recommendations with patient, family, nursing staff and Dr Dayo WATSON: 2 days       April Zuleta, APRN  9/9/2017  9:27 AM

## 2017-09-10 VITALS
WEIGHT: 152 LBS | DIASTOLIC BLOOD PRESSURE: 67 MMHG | TEMPERATURE: 97.6 F | OXYGEN SATURATION: 95 % | RESPIRATION RATE: 16 BRPM | HEART RATE: 76 BPM | SYSTOLIC BLOOD PRESSURE: 109 MMHG | HEIGHT: 68 IN | BODY MASS INDEX: 23.04 KG/M2

## 2017-09-10 LAB
BASOPHILS # BLD AUTO: 0.01 10*3/MM3 (ref 0–0.2)
BASOPHILS NFR BLD AUTO: 0.2 % (ref 0–1.5)
DEPRECATED RDW RBC AUTO: 44 FL (ref 37–54)
EOSINOPHIL # BLD AUTO: 0.05 10*3/MM3 (ref 0–0.7)
EOSINOPHIL NFR BLD AUTO: 0.8 % (ref 0.3–6.2)
ERYTHROCYTE [DISTWIDTH] IN BLOOD BY AUTOMATED COUNT: 13 % (ref 11.5–14.5)
HCT VFR BLD AUTO: 33.4 % (ref 40.4–52.2)
HGB BLD-MCNC: 10.7 G/DL (ref 13.7–17.6)
IMM GRANULOCYTES # BLD: 0 10*3/MM3 (ref 0–0.03)
IMM GRANULOCYTES NFR BLD: 0 % (ref 0–0.5)
LYMPHOCYTES # BLD AUTO: 1.14 10*3/MM3 (ref 0.9–4.8)
LYMPHOCYTES NFR BLD AUTO: 18.7 % (ref 19.6–45.3)
MCH RBC QN AUTO: 29.6 PG (ref 27–32.7)
MCHC RBC AUTO-ENTMCNC: 32 G/DL (ref 32.6–36.4)
MCV RBC AUTO: 92.5 FL (ref 79.8–96.2)
MONOCYTES # BLD AUTO: 0.88 10*3/MM3 (ref 0.2–1.2)
MONOCYTES NFR BLD AUTO: 14.4 % (ref 5–12)
NEUTROPHILS # BLD AUTO: 4.03 10*3/MM3 (ref 1.9–8.1)
NEUTROPHILS NFR BLD AUTO: 65.9 % (ref 42.7–76)
PLATELET # BLD AUTO: 143 10*3/MM3 (ref 140–500)
PMV BLD AUTO: 10.4 FL (ref 6–12)
RBC # BLD AUTO: 3.61 10*6/MM3 (ref 4.6–6)
WBC NRBC COR # BLD: 6.11 10*3/MM3 (ref 4.5–10.7)

## 2017-09-10 PROCEDURE — 85025 COMPLETE CBC W/AUTO DIFF WBC: CPT | Performed by: NURSE PRACTITIONER

## 2017-09-10 PROCEDURE — 99024 POSTOP FOLLOW-UP VISIT: CPT | Performed by: NEUROLOGICAL SURGERY

## 2017-09-10 PROCEDURE — G0008 ADMIN INFLUENZA VIRUS VAC: HCPCS | Performed by: NEUROLOGICAL SURGERY

## 2017-09-10 PROCEDURE — 25010000002 INFLUENZA VAC SUBUNIT QUAD 0.5 ML SUSPENSION PREFILLED SYRINGE: Performed by: NEUROLOGICAL SURGERY

## 2017-09-10 PROCEDURE — 90661 CCIIV3 VAC ABX FR 0.5 ML IM: CPT | Performed by: NEUROLOGICAL SURGERY

## 2017-09-10 RX ORDER — PSEUDOEPHEDRINE HCL 30 MG
200 TABLET ORAL 2 TIMES DAILY
Qty: 60 CAPSULE | Refills: 1 | Status: SHIPPED | OUTPATIENT
Start: 2017-09-10

## 2017-09-10 RX ORDER — CYCLOBENZAPRINE HCL 10 MG
10 TABLET ORAL 3 TIMES DAILY PRN
Qty: 42 TABLET | Refills: 1 | Status: SHIPPED | OUTPATIENT
Start: 2017-09-10 | End: 2017-10-10 | Stop reason: HOSPADM

## 2017-09-10 RX ORDER — HYDROCODONE BITARTRATE AND ACETAMINOPHEN 5; 325 MG/1; MG/1
2 TABLET ORAL EVERY 4 HOURS PRN
Qty: 60 TABLET | Refills: 0 | Status: SHIPPED | OUTPATIENT
Start: 2017-09-10 | End: 2017-10-10 | Stop reason: HOSPADM

## 2017-09-10 RX ADMIN — DOCUSATE SODIUM 200 MG: 100 CAPSULE, LIQUID FILLED ORAL at 08:19

## 2017-09-10 RX ADMIN — LISINOPRIL 10 MG: 10 TABLET ORAL at 08:19

## 2017-09-10 RX ADMIN — HYDROCODONE BITARTRATE AND ACETAMINOPHEN 2 TABLET: 5; 325 TABLET ORAL at 08:19

## 2017-09-10 RX ADMIN — A/SINGAPORE/GP1908/2015 IVR-180 (H1N1) (AN A/MICHIGAN/45/2015-LIKE VIRUS), A/SINGAPORE/GP2050/2015 (H3N2) (AN A/HONG KONG/4801/2014 - LIKE VIRUS), B/UTAH/9/2014 (A B/PHUKET/3073/2013-LIKE VIRUS), B/HONG KONG/259/2010 (A B/BRISBANE/60/08-LIKE VIRUS) 0.5 ML: 15; 15; 15; 15 INJECTION, SUSPENSION INTRAMUSCULAR at 12:14

## 2017-09-10 RX ADMIN — ATORVASTATIN CALCIUM 20 MG: 20 TABLET, FILM COATED ORAL at 08:19

## 2017-09-10 NOTE — DISCHARGE SUMMARY
Patient did very well and his vital signs and labs john stable.  His chronic foot drop is improving.  His wound looks pristine. His SLR is resolved.  He has DME en route today.  He will follow up as scheduled with Dr. Jennings.  He will call for any issues.  We discussed restrictions at length and desires d/c today.

## 2017-09-11 NOTE — PROGRESS NOTES
Case Management Discharge Note    Final Note: Home.  No HH ordered    Discharge Placement     Facility/Agency Request Status Selected? Address Phone Number Fax Number    [facility] Pending - No Request Sent     [address] [phone]          Other: Other (car)    Discharge Codes: 01  Discharge to home

## 2017-09-12 ENCOUNTER — CLINICAL SUPPORT (OUTPATIENT)
Dept: FAMILY MEDICINE CLINIC | Facility: CLINIC | Age: 72
End: 2017-09-12

## 2017-09-12 DIAGNOSIS — I63.89 CEREBROVASCULAR ACCIDENT (CVA) DUE TO OTHER MECHANISM (HCC): Primary | ICD-10-CM

## 2017-09-12 PROCEDURE — 96372 THER/PROPH/DIAG INJ SC/IM: CPT | Performed by: FAMILY MEDICINE

## 2017-09-12 RX ADMIN — CYANOCOBALAMIN 1000 MCG: 1000 INJECTION, SOLUTION INTRAMUSCULAR; SUBCUTANEOUS at 10:19

## 2017-09-22 ENCOUNTER — OFFICE VISIT (OUTPATIENT)
Dept: NEUROSURGERY | Facility: CLINIC | Age: 72
End: 2017-09-22

## 2017-09-22 VITALS
WEIGHT: 158.8 LBS | SYSTOLIC BLOOD PRESSURE: 146 MMHG | DIASTOLIC BLOOD PRESSURE: 77 MMHG | HEART RATE: 57 BPM | BODY MASS INDEX: 24.07 KG/M2 | HEIGHT: 68 IN

## 2017-09-22 DIAGNOSIS — Z09 SURGICAL FOLLOWUP VISIT: Primary | ICD-10-CM

## 2017-09-22 PROCEDURE — 99024 POSTOP FOLLOW-UP VISIT: CPT | Performed by: NURSE PRACTITIONER

## 2017-09-22 NOTE — PROGRESS NOTES
Subjective   Patient ID: Julio Busby is a 71 y.o. male is here as a post op L4  L5 lumbar fusion decompression with pedicle screws on 09/7/17 with Dr. Jennings. Patient is doing well. Wound is clean and dry with no redness. Julio is not taking any pain medication except a rare Hydrocodone 5-3.25 at night.    Back Pain   The current episode started 1 to 4 weeks ago. The problem has been gradually improving since onset. The pain is present in the lumbar spine. The quality of the pain is described as aching. The pain does not radiate. The pain is mild. The pain is worse during the night. Pertinent negatives include no abdominal pain, bladder incontinence, bowel incontinence, leg pain or numbness. He has tried walking for the symptoms. The treatment provided moderate relief.           Review of Systems   Gastrointestinal: Negative for abdominal pain and bowel incontinence.   Genitourinary: Negative for bladder incontinence.   Musculoskeletal: Positive for back pain.   Neurological: Negative for numbness.   All other systems reviewed and are negative.      Objective   Physical Exam   Constitutional: He is oriented to person, place, and time. He appears well-developed. No distress.   Musculoskeletal:   No calf swelling or tenderness with palpation.    Neurological: He is alert and oriented to person, place, and time.   No motor or sensory deficits on exam.    Skin: Skin is warm and dry.   The lumbar incisions are well approximated. No redness, swelling or drainage. All steri strips removed.    Psychiatric: He has a normal mood and affect.     Neurologic Exam     Mental Status   Oriented to person, place, and time.       Assessment/Plan     Medical Decision Making:      Mr. Busby is now two weeks out from lumbar fusion surgery with Dr Jennings. He is doing very well. He has no leg pain and some mild, expected low back pain. He is only taking a pain pill occasionally at night. He is walking and tolerating it well.  He is very pleased with his progress.    Walking was encouraged. Mr. Busby will continue cleaning his incisions with peroxide BID until healed. He will call us if he has any questions or concerns. I will see him again in 3 weeks.     Julio was seen today for post-op.    Diagnoses and all orders for this visit:    Surgical followup visit      Return in about 3 weeks (around 10/13/2017).

## 2017-10-10 ENCOUNTER — OFFICE VISIT (OUTPATIENT)
Dept: NEUROSURGERY | Facility: CLINIC | Age: 72
End: 2017-10-10

## 2017-10-10 VITALS
HEART RATE: 45 BPM | BODY MASS INDEX: 23.95 KG/M2 | WEIGHT: 158 LBS | HEIGHT: 68 IN | SYSTOLIC BLOOD PRESSURE: 104 MMHG | DIASTOLIC BLOOD PRESSURE: 70 MMHG

## 2017-10-10 DIAGNOSIS — Z09 SURGICAL FOLLOWUP VISIT: Primary | ICD-10-CM

## 2017-10-10 PROCEDURE — 99024 POSTOP FOLLOW-UP VISIT: CPT | Performed by: NURSE PRACTITIONER

## 2017-10-10 NOTE — PROGRESS NOTES
Subjective   Patient ID: Julio Busby is a 72 y.o. male is here today for follow-up.  He is 5 wks out from a left L4-5 decompression/PLIF by Dr. Jennings 9/7/17.  He is doing well.  He is concerned about lower back pain when walking up and down steps.  He is not taking any pain medications at this time.     Back Pain   The quality of the pain is described as aching. The pain is at a severity of 2/10. The pain is mild. The symptoms are aggravated by bending. Pertinent negatives include no numbness, tingling or weakness.       Review of Systems   Musculoskeletal: Positive for back pain.   Neurological: Negative for tingling, weakness and numbness.   All other systems reviewed and are negative.      Objective   Physical Exam   Constitutional: He appears well-developed. No distress.   Skin: Skin is warm and dry.   The lumbar incisions are healed.      Psychiatric: He has a normal mood and affect.   Vitals reviewed.    Neurologic Exam    Assessment/Plan     Medical Decision Making:      Mr. Busby is now close to 6 weeks out from left L4-5 decompression/PLIF with Dr. Jennings.  He is very happy with his progress.  He is having no leg pain.  He is having some mild low back pain but it is manageable.  He is walking but would like to try some physical therapy to continue with some core and balance strengthening.    Mr. Busby will call the office if he explains his any worsening symptoms.  He will return to the office in 6 weeks.    Julio was seen today for post-op.    Diagnoses and all orders for this visit:    Surgical followup visit  -     Ambulatory Referral to Physical Therapy Evaluate and treat (for core and balance strengthening exercises and intro to [facility])      Return in about 6 weeks (around 11/21/2017).

## 2017-10-17 ENCOUNTER — CLINICAL SUPPORT (OUTPATIENT)
Dept: FAMILY MEDICINE CLINIC | Facility: CLINIC | Age: 72
End: 2017-10-17

## 2017-10-17 ENCOUNTER — TREATMENT (OUTPATIENT)
Dept: PHYSICAL THERAPY | Facility: CLINIC | Age: 72
End: 2017-10-17

## 2017-10-17 DIAGNOSIS — R26.9 GAIT DISTURBANCE: ICD-10-CM

## 2017-10-17 DIAGNOSIS — R29.3 POOR POSTURE: ICD-10-CM

## 2017-10-17 DIAGNOSIS — Z98.1 S/P LUMBAR SPINAL FUSION: Primary | ICD-10-CM

## 2017-10-17 DIAGNOSIS — R29.898 LEG WEAKNESS, BILATERAL: ICD-10-CM

## 2017-10-17 PROCEDURE — 96372 THER/PROPH/DIAG INJ SC/IM: CPT | Performed by: FAMILY MEDICINE

## 2017-10-17 PROCEDURE — G0283 ELEC STIM OTHER THAN WOUND: HCPCS | Performed by: PHYSICAL THERAPIST

## 2017-10-17 PROCEDURE — 97161 PT EVAL LOW COMPLEX 20 MIN: CPT | Performed by: PHYSICAL THERAPIST

## 2017-10-17 RX ADMIN — CYANOCOBALAMIN 1000 MCG: 1000 INJECTION, SOLUTION INTRAMUSCULAR; SUBCUTANEOUS at 10:25

## 2017-10-17 NOTE — PROGRESS NOTES
Physical Therapy Initial Evaluation and Plan of Care    Patient: Julio Busby   : 1945  Diagnosis/ICD-10 Code:  Gait disturbance [R26.9]  Referring practitioner: Alejandra Escobedo MD  Past medical Hx reviewed: 10/17/2017     Subjective Evaluation    History of Present Illness  Date of surgery: 2017  Mechanism of injury: I was in therapy early in the year for some leg weakness/ pain.  I did pretty good but the legs still were somewhat weak.  I finally saw neurosurgeon and they did sx on the back.  Stenosis and (spondylolisthesis) noted.    Currently, having difficulty with walking and sitting more.  10-15 min of walking is enough to make the back hurt.  I feel weakness in the legs too.  I manage myself pretty well but using ice and medication to help.  The back pain seems to be steady.   I did not have rehabilitation or HHPT after surgery but would like to try treatment to help with the back pain.          Patient Occupation: retired    Precautions and Work Restrictions: No lifting > 10 lbs.  Quality of life: good    Pain  Current pain ratin  At best pain ratin  At worst pain ratin  Location: Across the low back.  In the center.    Quality: dull ache and burning (Weakness in the R vs the L.  )  Relieving factors: ice and rest  Aggravating factors: ambulation, prolonged positioning, sleeping, repetitive movement, standing and lifting  Progression: no change (since sx. )    Treatments  Previous treatment: medication     Objective       Static Posture     Thoracic Spine  Hyperkyphosis.    Lumbar Spine   Flattened.     Neurological Testing   Sensation     Lumbar   Left   Intact: light touch    Right   Intact: light touch    Active Range of Motion     Lumbar   Flexion: WFL  Extension: WFL  Left lateral flexion: WFL  Right lateral flexion: WFL  Left rotation: WFL  Right rotation: WFL    Additional Active Range of Motion Details  Functional ROM all planes with no complaints on increased pain in any  "motions.      Strength/Myotome Testing     Left Hip   Planes of Motion   Flexion: 4+  Extension: 4+  Abduction: 4+  Adduction: 5  External rotation: 4+  Internal rotation: 4+    Right Hip   Planes of Motion   Flexion: 4  Extension: 4+  Abduction: 4  Adduction: 5  External rotation: 4+  Internal rotation: 4+    Left Knee   Flexion: 5  Extension: 5    Right Knee   Flexion: 5  Extension: 5    Left Ankle/Foot   Dorsiflexion: 5    Right Ankle/Foot   Dorsiflexion: 5    Ambulation     Observational Gait   Decreased walking speed and stride length.     Additional Observational Gait Details  Poor coodination of movement.  Some difficulty initiating pattern       Quality of Movement During Gait   Trunk  Extensor lurch and posterior lean.     Functional Assessment     Forward Step Up 8\"   Left Leg  Trunk extension (required UE assist per PT to help steady.  ).     Right Leg  Trunk extension (required UE assist per PT to help steady. ).     Comments  TUG: without AD:  12 sec.   30 sec sit to stand:  12 x no AD.          Assessment & Plan     Assessment  Impairments: abnormal coordination, abnormal gait, activity intolerance, impaired balance, impaired physical strength, lacks appropriate home exercise program, pain with function, safety issue and weight-bearing intolerance  Assessment details: Pt presents to PT with symptoms consistent with lumbar decompression and fusion post-operative limitations of the trunk and LE.  Pt would benefit from skilled PT intervention to address the deficits noted.     Prognosis: good  Prognosis details:       Functional Limitations: carrying objects, lifting, sleeping, walking, uncomfortable because of pain, moving in bed, sitting and unable to perform repetitive tasks  Goals  Plan Goals:     SHORT TERM GOALS: Time for Goal Achievement: 3 weeks    1.  Patient to be compliant and progression of HEP                             2.  Pain level < 3/10 at worst with mentioned activities to improve " function  3.  Increased thoracic, lumbar and SIJ mobility to allow for increased lumbar AROM with less pain.    LONG TERM GOALS: Time for Goal Achievement: 2 months  1.  Pt. to score < 15 % on Back Index  2.  Pain level < 1/10 with all listed activities to return to normal.  3.  Lumbar AROM to WFL to allow for return to household & recreational activities w/o increased symptoms  4.  (B) LE and lower abdominal strength to 5/5 to allow for pushing, pulling and activities to occur without pain (driving, sitting, household  & self requirements)  5.  Pt to report the ability to walk at least 20 min with pain < 2/10.    6.  Increased TUG time < 9 sec. And no LOB or limitation initiating gait after standing.      Plan  Therapy options: will be seen for skilled physical therapy services  Planned modality interventions: cryotherapy, electrical stimulation/Russian stimulation, TENS and thermotherapy (hydrocollator packs)  Other planned modality interventions: Dry Needling  Planned therapy interventions: body mechanics training, flexibility, functional ROM exercises, home exercise program, manual therapy, neuromuscular re-education, postural training, stretching, strengthening, soft tissue mobilization, transfer training, therapeutic activities, IADL retraining, gait training, balance/weight-bearing training and ADL retraining  Frequency: 2x week  Duration in weeks: 8  Treatment plan discussed with: patient        Manual Therapy:    -     mins  35381;  Therapeutic Exercise:    15     mins  40698;     Neuromuscular Angel:    -    mins  27394;    Therapeutic Activity:     -     mins  10801;     Gait Training:      -     mins  69220;     Ultrasound:     -     mins  38151;    Electrical Stimulation:    -     mins  91955 ( );  Dry Needling     -     mins self-pay    Timed Treatment:   15   mins   Total Treatment:     60   mins    PT SIGNATURE: LALO Petersen License #: [id number]    DATE TREATMENT INITIATED:  10/17/2017    Medicare Initial Certification  Certification Period: 1/15/2018  I certify that the therapy services are furnished while this patient is under my care.  The services outlined above are required by this patient, and will be reviewed every 90 days.     PHYSICIAN: Alejandra Escobedo MD      DATE:     Please sign and return via fax to [phone].. Thank you, [facility].

## 2017-10-17 NOTE — PATIENT INSTRUCTIONS
.HEP provided to patient with picture handouts.  Exercise to be performed 2-3x/day daily. Pt demonstrated good understanding and agreed with established plan.

## 2017-10-20 ENCOUNTER — TREATMENT (OUTPATIENT)
Dept: PHYSICAL THERAPY | Facility: CLINIC | Age: 72
End: 2017-10-20

## 2017-10-20 DIAGNOSIS — R29.3 POOR POSTURE: ICD-10-CM

## 2017-10-20 DIAGNOSIS — R26.9 GAIT DISTURBANCE: Primary | ICD-10-CM

## 2017-10-20 DIAGNOSIS — Z98.1 S/P LUMBAR SPINAL FUSION: ICD-10-CM

## 2017-10-20 DIAGNOSIS — R29.898 LEG WEAKNESS, BILATERAL: ICD-10-CM

## 2017-10-20 PROCEDURE — 97110 THERAPEUTIC EXERCISES: CPT | Performed by: PHYSICAL THERAPIST

## 2017-10-20 NOTE — PROGRESS NOTES
Physical Therapy Daily Progress Note  Visits:2    Subjective : Julio Busby reports: Can't really say that the TENS helped much last time.  It felt good while it was     Objective   See Exercise, Manual, and Modality Logs for complete treatment.     Assessment/Plan:Pt tolerated TE progression well and was provided HEP to begin working on at home.  He is very challenged to weight bear through the forefoot and has significant heel strike (B) with trunk extension posture.    Progress per Plan of Care         Manual Therapy:    -     mins  31269;  Therapeutic Exercise:    25     mins  04339;     Neuromuscular Angel:    4    mins  31486;    Therapeutic Activity:     4     mins  70166;     Gait Training:      -     mins  69583;     Ultrasound:     -     mins  62100;    Electrical Stimulation:    -     mins  57521 ( );  Dry Needling     -     mins self-pay    Timed Treatment:   33   mins   Total Treatment:     60   mins    LLAO Petersen License #[id number]    Physical Therapist

## 2017-10-24 ENCOUNTER — TREATMENT (OUTPATIENT)
Dept: PHYSICAL THERAPY | Facility: CLINIC | Age: 72
End: 2017-10-24

## 2017-10-24 DIAGNOSIS — R29.898 LEG WEAKNESS, BILATERAL: ICD-10-CM

## 2017-10-24 DIAGNOSIS — R26.9 GAIT DISTURBANCE: Primary | ICD-10-CM

## 2017-10-24 DIAGNOSIS — R29.3 POOR POSTURE: ICD-10-CM

## 2017-10-24 DIAGNOSIS — Z98.1 S/P LUMBAR SPINAL FUSION: ICD-10-CM

## 2017-10-24 PROCEDURE — 97112 NEUROMUSCULAR REEDUCATION: CPT | Performed by: PHYSICAL THERAPIST

## 2017-10-24 PROCEDURE — 97110 THERAPEUTIC EXERCISES: CPT | Performed by: PHYSICAL THERAPIST

## 2017-10-24 NOTE — PROGRESS NOTES
Physical Therapy Daily Progress Note  Visits:3    Subjective : Julio Chichoyenifer reports: I wasn't sore from last visit.  I did fine from last time.      Objective   See Exercise, Manual, and Modality Logs for complete treatment.     Assessment/Plan:Pt tolerated treatment progression well.  Still has difficulty with gait mechanics     Progress per Plan of Care       Manual Therapy:    -     mins  68910;  Therapeutic Exercise:    40     mins  11835;     Neuromuscular Angel:    8    mins  52907;    Therapeutic Activity:     -     mins  48777;     Gait Training:      -     mins  62373;     Ultrasound:     -     mins  90048;    Electrical Stimulation:    -     mins  11180 ( );  Dry Needling     -     mins self-pay    Timed Treatment:   48   mins   Total Treatment:     55   mins    Santo Gutierrez PT  [place] License #[id number]    Physical Therapist

## 2017-11-21 ENCOUNTER — DOCUMENTATION (OUTPATIENT)
Dept: PHYSICAL THERAPY | Facility: CLINIC | Age: 72
End: 2017-11-21

## 2017-11-21 NOTE — PROGRESS NOTES
NAME: Julio Busby     PATIENT MRN: [id number]  DATE: 2017    Discharge Note   Visits: 3, Treatment dates 10-17-17 to 10-24-17    Subjective     N/A    Objective       Goals:     Assessment/Plan /Discharge status.    Pt no longer treated.  Pt .      Plan Additional:      Santo Gutierrez, PT  Physical Therapist  [place] #[id number]

## (undated) DEVICE — DIFFUSER: Brand: CORE, MAESTRO

## (undated) DEVICE — STPLR SKIN VISISTAT WD 35CT

## (undated) DEVICE — DISPOSABLE BIPOLAR FORCEPS 7 3/4" (19.7CM) SCOVILLE BAYONET, 1.5MM TIP AND 12 FT. (3.6M) CABLE: Brand: KIRWAN

## (undated) DEVICE — ENCORE® LATEX ORTHO SIZE 8, STERILE LATEX POWDER-FREE SURGICAL GLOVE: Brand: ENCORE

## (undated) DEVICE — SYR CONTRL LUERLOK 10CC

## (undated) DEVICE — DRSNG TELFA PAD NONADH STR 1S 3X4IN

## (undated) DEVICE — DRP MICROSCP LEICA W/GLASS LENS

## (undated) DEVICE — 6.0MM PRECISION ROUND

## (undated) DEVICE — FLOSEAL MATRIX IS INDICATED IN SURGICAL PROCEDURES (OTHER THAN IN OPHTHALMIC) AS AN ADJUNCT TO HEMOSTASIS WHEN CONTROL OF BLEEDING BY LIGATURE OR CONVENTIONAL PROCEDURES IS INEFFECTIVE OR IMPRACTICAL.: Brand: FLOSEAL HEMOSTATIC MATRIX

## (undated) DEVICE — GUIDEWIRE 2345050 BLUNT THREADED 24IN

## (undated) DEVICE — ADHESIVE ISLAND DRESSING: Brand: TELFA

## (undated) DEVICE — DRP STRL STERILEZ ZFLD

## (undated) DEVICE — 3.0MM PRECISION NEURO (MATCH HEAD)

## (undated) DEVICE — PK NEURO SPINE 40

## (undated) DEVICE — PK ATS CUST W CARDIOTOMY RESEVOIR

## (undated) DEVICE — NDL SPINE 18G 31/2IN PNK

## (undated) DEVICE — APPL CHLORAPREP W/TINT 26ML ORNG

## (undated) DEVICE — INSTRUMENT 8670015 PAK 2 NEEDLES TROCAR

## (undated) DEVICE — SPHR MARKR STEALTH STATION

## (undated) DEVICE — ANTIBACTERIAL UNDYED BRAIDED (POLYGLACTIN 910), SYNTHETIC ABSORBABLE SUTURE: Brand: COATED VICRYL

## (undated) DEVICE — 3M™ STERI-STRIP™ REINFORCED ADHESIVE SKIN CLOSURES, R1547, 1/2 IN X 4 IN (12 MM X 100 MM), 6 STRIPS/ENVELOPE: Brand: 3M™ STERI-STRIP™

## (undated) DEVICE — DRSNG SURESITE WNDW 4X4.5

## (undated) DEVICE — PAD,ABDOMINAL,8"X10",ST,LF: Brand: MEDLINE

## (undated) DEVICE — 3M™ STERI-DRAPE™ INSTRUMENT POUCH 1018: Brand: STERI-DRAPE™

## (undated) DEVICE — GLV SURG BIOGEL LTX PF 7

## (undated) DEVICE — 1010 S-DRAPE TOWEL DRAPE 10/BX: Brand: STERI-DRAPE™

## (undated) DEVICE — NDL SPINE 22G 31/2IN BLK

## (undated) DEVICE — GAUZE,SPONGE,4"X4",16PLY,XRAY,STRL,LF: Brand: MEDLINE

## (undated) DEVICE — 3M™ IOBAN™ 2 ANTIMICROBIAL INCISE DRAPE 6650EZ: Brand: IOBAN™ 2

## (undated) DEVICE — COVER,TABLE,44X90,STERILE: Brand: MEDLINE

## (undated) DEVICE — TOTAL TRAY, 16FR 10ML SIL FOLEY, URN: Brand: MEDLINE

## (undated) DEVICE — MARKR SKIN W/RULR AND LBL

## (undated) DEVICE — OIL CARTRIDGE: Brand: CORE, MAESTRO

## (undated) DEVICE — MEDI-VAC YANKAUER SUCTION HANDLE W/BULBOUS TIP: Brand: CARDINAL HEALTH

## (undated) DEVICE — GOWN,NON-REINFORCED,SIRUS,SET IN SLV,XXL: Brand: MEDLINE

## (undated) DEVICE — TOWEL,OR,DSP,ST,BLUE,STD,4/PK,20PK/CS: Brand: MEDLINE